# Patient Record
Sex: MALE | Race: WHITE | Employment: OTHER | URBAN - METROPOLITAN AREA
[De-identification: names, ages, dates, MRNs, and addresses within clinical notes are randomized per-mention and may not be internally consistent; named-entity substitution may affect disease eponyms.]

---

## 2021-11-16 ENCOUNTER — HOSPITAL ENCOUNTER (OUTPATIENT)
Dept: PREADMISSION TESTING | Age: 71
Discharge: HOME | End: 2021-11-20
Payer: MEDICARE

## 2021-11-16 VITALS
RESPIRATION RATE: 16 BRPM | BODY MASS INDEX: 31.88 KG/M2 | HEIGHT: 77 IN | WEIGHT: 270 LBS | SYSTOLIC BLOOD PRESSURE: 117 MMHG | OXYGEN SATURATION: 99 % | HEART RATE: 84 BPM | DIASTOLIC BLOOD PRESSURE: 75 MMHG

## 2021-11-16 LAB
ABSOLUTE EOS #: 0.22 K/UL (ref 0–0.44)
ABSOLUTE IMMATURE GRANULOCYTE: 0.02 K/UL (ref 0–0.3)
ABSOLUTE LYMPH #: 2.45 K/UL (ref 1.1–3.7)
ABSOLUTE MONO #: 0.65 K/UL (ref 0.1–1.2)
ANION GAP SERPL CALCULATED.3IONS-SCNC: 10 MMOL/L (ref 9–17)
BASOPHILS # BLD: 1 % (ref 0–2)
BASOPHILS ABSOLUTE: 0.06 K/UL (ref 0–0.2)
BUN BLDV-MCNC: 20 MG/DL (ref 8–23)
CHLORIDE BLD-SCNC: 107 MMOL/L (ref 98–107)
CO2: 25 MMOL/L (ref 20–31)
CREAT SERPL-MCNC: 1 MG/DL (ref 0.7–1.2)
DIFFERENTIAL TYPE: NORMAL
EOSINOPHILS RELATIVE PERCENT: 3 % (ref 1–4)
GFR AFRICAN AMERICAN: >60 ML/MIN
GFR NON-AFRICAN AMERICAN: >60 ML/MIN
GFR SERPL CREATININE-BSD FRML MDRD: NORMAL ML/MIN/{1.73_M2}
GFR SERPL CREATININE-BSD FRML MDRD: NORMAL ML/MIN/{1.73_M2}
HCT VFR BLD CALC: 43.6 % (ref 40.7–50.3)
HEMOGLOBIN: 14.1 G/DL (ref 13–17)
IMMATURE GRANULOCYTES: 0 %
LYMPHOCYTES # BLD: 34 % (ref 24–43)
MCH RBC QN AUTO: 31.2 PG (ref 25.2–33.5)
MCHC RBC AUTO-ENTMCNC: 32.3 G/DL (ref 28.4–34.8)
MCV RBC AUTO: 96.5 FL (ref 82.6–102.9)
MONOCYTES # BLD: 9 % (ref 3–12)
NRBC AUTOMATED: 0 PER 100 WBC
PDW BLD-RTO: 13.7 % (ref 11.8–14.4)
PLATELET # BLD: 268 K/UL (ref 138–453)
PLATELET ESTIMATE: NORMAL
PMV BLD AUTO: 9 FL (ref 8.1–13.5)
POTASSIUM SERPL-SCNC: 5 MMOL/L (ref 3.7–5.3)
RBC # BLD: 4.52 M/UL (ref 4.21–5.77)
RBC # BLD: NORMAL 10*6/UL
SEG NEUTROPHILS: 53 % (ref 36–65)
SEGMENTED NEUTROPHILS ABSOLUTE COUNT: 3.81 K/UL (ref 1.5–8.1)
SODIUM BLD-SCNC: 142 MMOL/L (ref 135–144)
WBC # BLD: 7.2 K/UL (ref 3.5–11.3)
WBC # BLD: NORMAL 10*3/UL

## 2021-11-16 PROCEDURE — 86901 BLOOD TYPING SEROLOGIC RH(D): CPT

## 2021-11-16 PROCEDURE — 86850 RBC ANTIBODY SCREEN: CPT

## 2021-11-16 PROCEDURE — 80051 ELECTROLYTE PANEL: CPT

## 2021-11-16 PROCEDURE — 87086 URINE CULTURE/COLONY COUNT: CPT

## 2021-11-16 PROCEDURE — 85025 COMPLETE CBC W/AUTO DIFF WBC: CPT

## 2021-11-16 PROCEDURE — 84520 ASSAY OF UREA NITROGEN: CPT

## 2021-11-16 PROCEDURE — 93005 ELECTROCARDIOGRAM TRACING: CPT | Performed by: ANESTHESIOLOGY

## 2021-11-16 PROCEDURE — 36415 COLL VENOUS BLD VENIPUNCTURE: CPT

## 2021-11-16 PROCEDURE — 86900 BLOOD TYPING SEROLOGIC ABO: CPT

## 2021-11-16 PROCEDURE — 82565 ASSAY OF CREATININE: CPT

## 2021-11-16 RX ORDER — HEPARIN SODIUM 5000 [USP'U]/ML
5000 INJECTION, SOLUTION INTRAVENOUS; SUBCUTANEOUS ONCE
Status: CANCELLED | OUTPATIENT
Start: 2021-11-23

## 2021-11-16 NOTE — PRE-PROCEDURE INSTRUCTIONS
[facility] ON Tuesday, November 23rd at 10:30 AM    Once you enter the hospital lobby, after your temperature is checked, take the elevators to the second floor. Check-In is at the surgery registration desk  One person age 25 or older may remain in the waiting area while you are in surgery. If you have been given a blood band, you must bring it with you the day of surgery. Continue to take your home medications as you normally do up to and including the night before surgery with the exception of any blood thinning medications. Please stop any blood thinning medications as directed by your surgeon or prescribing physician. Failure to stop certain medications may interfere with your scheduled surgery. These may include:  Aspirin, Warfarin (Coumadin), Clopidogrel (Plavix), Ibuprofen (Motrin, Advil), Naproxen (Aleve), Meloxicam (Mobic), Celecoxib (Celebrex), Eliquis, Pradaxa, Xarelto, Effient, Fish Oil, Herbal supplements. Tylenol is okay to take. If you are diabetic, do not take any of your diabetic medications by mouth the morning of surgery. If you are taking insulin contact the doctor that manages your diabetes for instructions about any changes to your insulin dosages the day before surgery. Do not inject insulin or other injectable diabetic medications the morning of surgery unless otherwise instructed by the doctor who manages your diabetes. Please take the following medication(s) the day of surgery with a small sip of water:    Please use your inhalers at home the day of surgery  COVID 13:50PM 11/19/2021    PREPARING FOR YOUR SURGERY:     Before surgery, you can play an important role in your own health. Because skin is not sterile, we need to be sure that your skin is as free of germs as possible before surgery by carefully washing before surgery. Preparing or prepping skin before surgery can reduce the risk of a surgical site infection.   Do not shave the area of your body where your surgery will be performed unless you received specific permission from your physician. Preoperative Bathing Instructions   Bathe or shower the day of surgery.  Wear clean clothing after bathing.  Shampoo hair before surgery   Keep nails clean    Do not apply alcohol-based hair or skin products,    Do not apply lotion, emollients, cosmetics, perfumes or deodorant the day of surgery.  Do not shave the area of your body where your surgery will be performed unless you receive specific permission from your surgeon. Patient Instructions:    [place] If you are having any type of anesthesia you are to have nothing to eat or drink after midnight the night before your surgery. This includes gum, mints, water or smoking or chewing tobacco.  The only exception to this is a small sip of water to take with any morning dose of heart, blood pressure, or seizure medications. No alcoholic beverages for 24 hours prior to surgery.  Brush your teeth but do not swallow water.  Bring your eyeglasses and case with you. No contacts are to be worn the day of surgery. You also may bring your hearing aids. Most surgical procedures involving anesthesia will require that you remove your dentures prior to surgery.  If you are on C-PAP or Bi-PAP at home and plan on staying in the hospital overnight for your surgery please bring the machine with you.  Do not wear any jewelry or body piercings day of surgery. Also, NO lotion, perfume or deodorant to be used the day of surgery. No nail polish on the operative extremity (arm/leg surgeries)       If you are staying overnight with us, please bring a SMALL bag of personal items.  Please wear loose, comfortable clothing. If you are potentially going to have a cast or brace bring clothing that will fit over them.                                                                                                                            In case of illness - If you have cold or flu like symptoms (high fever, runny nose, sore throat, cough, etc.) rash, nausea, vomiting, loose stools, and/or recent contact with someone who has a contagious disease (chicken pox, measles, etc.) Please call your doctor before coming to the hospital.     If your child is having surgery please make arrangements for any other children to be cared for at home on the day of surgery. Other children are not permitted in recovery room and we want you to be able to spend time with the patient. If other arrangements are not available then we suggest that you have a second adult to stay in the waiting room. Day of Surgery/Procedure:    As a patient at [facility] - INPATIENT you can expect quality medical and nursing care that is centered on your individual needs. Our goal is to make your surgical experience as comfortable as possible    . Transportation After Your Surgery/Procedure: You will need a friend or family member to drive you home after your procedure. Your  must be 25years of age or older and able to sign off on your discharge instructions. A taxi cab or any other form of public transportation is not acceptable. Your friend or family member must stay at the hospital throughout your procedure. Someone must remain with you for the first 24 hours after your surgery if you receive anesthesia or medication. If you do not have someone to stay with you, your procedure may be cancelled.       If you have any other questions regarding your procedure or the day of surgery, please call [phone]      _________________________  ____________________________  Signature (Patient)              Signature (Provider) & date

## 2021-11-17 LAB
ABO/RH: NORMAL
ANTIBODY SCREEN: NEGATIVE
ARM BAND NUMBER: NORMAL
CULTURE: NORMAL
EKG ATRIAL RATE: 62 BPM
EKG P AXIS: 21 DEGREES
EKG P-R INTERVAL: 212 MS
EKG Q-T INTERVAL: 390 MS
EKG QRS DURATION: 88 MS
EKG QTC CALCULATION (BAZETT): 395 MS
EKG R AXIS: 90 DEGREES
EKG T AXIS: 51 DEGREES
EKG VENTRICULAR RATE: 62 BPM
EXPIRATION DATE: NORMAL
Lab: NORMAL
SPECIMEN DESCRIPTION: NORMAL

## 2021-11-17 PROCEDURE — 93010 ELECTROCARDIOGRAM REPORT: CPT | Performed by: INTERNAL MEDICINE

## 2021-11-19 ENCOUNTER — HOSPITAL ENCOUNTER (OUTPATIENT)
Dept: LAB | Age: 71
Setting detail: SPECIMEN
Discharge: HOME | End: 2021-11-19
Payer: MEDICARE

## 2021-11-19 DIAGNOSIS — Z01.818 PREOP TESTING: Primary | ICD-10-CM

## 2021-11-19 PROCEDURE — U0005 INFEC AGEN DETEC AMPLI PROBE: HCPCS

## 2021-11-19 PROCEDURE — U0003 INFECTIOUS AGENT DETECTION BY NUCLEIC ACID (DNA OR RNA); SEVERE ACUTE RESPIRATORY SYNDROME CORONAVIRUS 2 (SARS-COV-2) (CORONAVIRUS DISEASE [COVID-19]), AMPLIFIED PROBE TECHNIQUE, MAKING USE OF HIGH THROUGHPUT TECHNOLOGIES AS DESCRIBED BY CMS-2020-01-R: HCPCS

## 2021-11-20 LAB
SARS-COV-2: NORMAL
SARS-COV-2: NOT DETECTED
SOURCE: NORMAL

## 2021-11-23 ENCOUNTER — ANESTHESIA EVENT (OUTPATIENT)
Dept: OPERATING ROOM | Age: 71
DRG: 716 | End: 2021-11-23
Payer: MEDICARE

## 2021-11-23 ENCOUNTER — ANESTHESIA (OUTPATIENT)
Dept: OPERATING ROOM | Age: 71
DRG: 716 | End: 2021-11-23
Payer: MEDICARE

## 2021-11-23 ENCOUNTER — HOSPITAL ENCOUNTER (INPATIENT)
Age: 71
LOS: 1 days | Discharge: HOME | DRG: 716 | End: 2021-11-24
Attending: UROLOGY | Admitting: UROLOGY
Payer: MEDICARE

## 2021-11-23 VITALS — TEMPERATURE: 96.8 F | OXYGEN SATURATION: 95 % | DIASTOLIC BLOOD PRESSURE: 61 MMHG | SYSTOLIC BLOOD PRESSURE: 100 MMHG

## 2021-11-23 DIAGNOSIS — C61 PROSTATE CANCER (HCC): Primary | ICD-10-CM

## 2021-11-23 LAB
ANION GAP SERPL CALCULATED.3IONS-SCNC: 9 MMOL/L (ref 9–17)
BUN BLDV-MCNC: 15 MG/DL (ref 8–23)
BUN/CREAT BLD: 16 (ref 9–20)
CALCIUM SERPL-MCNC: 9.2 MG/DL (ref 8.6–10.4)
CHLORIDE BLD-SCNC: 105 MMOL/L (ref 98–107)
CO2: 23 MMOL/L (ref 20–31)
CREAT SERPL-MCNC: 0.96 MG/DL (ref 0.7–1.2)
GFR AFRICAN AMERICAN: >60 ML/MIN
GFR NON-AFRICAN AMERICAN: >60 ML/MIN
GFR SERPL CREATININE-BSD FRML MDRD: ABNORMAL ML/MIN/{1.73_M2}
GFR SERPL CREATININE-BSD FRML MDRD: ABNORMAL ML/MIN/{1.73_M2}
GLUCOSE BLD-MCNC: 135 MG/DL (ref 70–99)
HCT VFR BLD CALC: 41.5 % (ref 40.7–50.3)
HEMOGLOBIN: 13.6 G/DL (ref 13–17)
MCH RBC QN AUTO: 31.6 PG (ref 25.2–33.5)
MCHC RBC AUTO-ENTMCNC: 32.8 G/DL (ref 28.4–34.8)
MCV RBC AUTO: 96.3 FL (ref 82.6–102.9)
NRBC AUTOMATED: 0 PER 100 WBC
PDW BLD-RTO: 13.6 % (ref 11.8–14.4)
PLATELET # BLD: 226 K/UL (ref 138–453)
PMV BLD AUTO: 9 FL (ref 8.1–13.5)
POTASSIUM SERPL-SCNC: 4.4 MMOL/L (ref 3.7–5.3)
RBC # BLD: 4.31 M/UL (ref 4.21–5.77)
SODIUM BLD-SCNC: 137 MMOL/L (ref 135–144)
WBC # BLD: 9.8 K/UL (ref 3.5–11.3)

## 2021-11-23 PROCEDURE — 2580000003 HC RX 258: Performed by: ANESTHESIOLOGY

## 2021-11-23 PROCEDURE — 6360000002 HC RX W HCPCS: Performed by: UROLOGY

## 2021-11-23 PROCEDURE — 88309 TISSUE EXAM BY PATHOLOGIST: CPT

## 2021-11-23 PROCEDURE — 7100000000 HC PACU RECOVERY - FIRST 15 MIN: Performed by: UROLOGY

## 2021-11-23 PROCEDURE — 3600000019 HC SURGERY ROBOT ADDTL 15MIN: Performed by: UROLOGY

## 2021-11-23 PROCEDURE — 2720000010 HC SURG SUPPLY STERILE: Performed by: UROLOGY

## 2021-11-23 PROCEDURE — 1200000000 HC SEMI PRIVATE

## 2021-11-23 PROCEDURE — 2580000003 HC RX 258: Performed by: UROLOGY

## 2021-11-23 PROCEDURE — 2709999900 HC NON-CHARGEABLE SUPPLY: Performed by: UROLOGY

## 2021-11-23 PROCEDURE — 7100000001 HC PACU RECOVERY - ADDTL 15 MIN: Performed by: UROLOGY

## 2021-11-23 PROCEDURE — 85027 COMPLETE CBC AUTOMATED: CPT

## 2021-11-23 PROCEDURE — 88307 TISSUE EXAM BY PATHOLOGIST: CPT

## 2021-11-23 PROCEDURE — 6360000002 HC RX W HCPCS: Performed by: NURSE ANESTHETIST, CERTIFIED REGISTERED

## 2021-11-23 PROCEDURE — 36415 COLL VENOUS BLD VENIPUNCTURE: CPT

## 2021-11-23 PROCEDURE — 3600000009 HC SURGERY ROBOT BASE: Performed by: UROLOGY

## 2021-11-23 PROCEDURE — S2900 ROBOTIC SURGICAL SYSTEM: HCPCS | Performed by: UROLOGY

## 2021-11-23 PROCEDURE — 3700000001 HC ADD 15 MINUTES (ANESTHESIA): Performed by: UROLOGY

## 2021-11-23 PROCEDURE — 80048 BASIC METABOLIC PNL TOTAL CA: CPT

## 2021-11-23 PROCEDURE — 6370000000 HC RX 637 (ALT 250 FOR IP): Performed by: UROLOGY

## 2021-11-23 PROCEDURE — 2500000003 HC RX 250 WO HCPCS: Performed by: NURSE ANESTHETIST, CERTIFIED REGISTERED

## 2021-11-23 PROCEDURE — 3700000000 HC ANESTHESIA ATTENDED CARE: Performed by: UROLOGY

## 2021-11-23 PROCEDURE — 2500000003 HC RX 250 WO HCPCS: Performed by: UROLOGY

## 2021-11-23 RX ORDER — BUPIVACAINE HYDROCHLORIDE AND EPINEPHRINE 2.5; 5 MG/ML; UG/ML
INJECTION, SOLUTION EPIDURAL; INFILTRATION; INTRACAUDAL; PERINEURAL PRN
Status: DISCONTINUED | OUTPATIENT
Start: 2021-11-23 | End: 2021-11-23 | Stop reason: HOSPADM

## 2021-11-23 RX ORDER — SODIUM CHLORIDE 9 MG/ML
INJECTION, SOLUTION INTRAVENOUS CONTINUOUS
Status: DISCONTINUED | OUTPATIENT
Start: 2021-11-23 | End: 2021-11-24 | Stop reason: HOSPADM

## 2021-11-23 RX ORDER — DOCUSATE SODIUM 100 MG/1
100 CAPSULE, LIQUID FILLED ORAL 2 TIMES DAILY
Status: DISCONTINUED | OUTPATIENT
Start: 2021-11-23 | End: 2021-11-24 | Stop reason: HOSPADM

## 2021-11-23 RX ORDER — PROPOFOL 10 MG/ML
INJECTION, EMULSION INTRAVENOUS PRN
Status: DISCONTINUED | OUTPATIENT
Start: 2021-11-23 | End: 2021-11-23 | Stop reason: SDUPTHER

## 2021-11-23 RX ORDER — CIPROFLOXACIN 500 MG/1
500 TABLET, FILM COATED ORAL 2 TIMES DAILY
Qty: 6 TABLET | Refills: 0 | Status: SHIPPED | OUTPATIENT
Start: 2021-11-23 | End: 2021-11-26

## 2021-11-23 RX ORDER — LIDOCAINE HYDROCHLORIDE 20 MG/ML
INJECTION, SOLUTION EPIDURAL; INFILTRATION; INTRACAUDAL; PERINEURAL PRN
Status: DISCONTINUED | OUTPATIENT
Start: 2021-11-23 | End: 2021-11-23 | Stop reason: SDUPTHER

## 2021-11-23 RX ORDER — POTASSIUM CHLORIDE 7.45 MG/ML
10 INJECTION INTRAVENOUS PRN
Status: DISCONTINUED | OUTPATIENT
Start: 2021-11-23 | End: 2021-11-24 | Stop reason: HOSPADM

## 2021-11-23 RX ORDER — SODIUM CHLORIDE 9 MG/ML
25 INJECTION, SOLUTION INTRAVENOUS PRN
Status: DISCONTINUED | OUTPATIENT
Start: 2021-11-23 | End: 2021-11-23 | Stop reason: HOSPADM

## 2021-11-23 RX ORDER — ONDANSETRON 2 MG/ML
4 INJECTION INTRAMUSCULAR; INTRAVENOUS EVERY 6 HOURS PRN
Status: DISCONTINUED | OUTPATIENT
Start: 2021-11-23 | End: 2021-11-24 | Stop reason: HOSPADM

## 2021-11-23 RX ORDER — HYDROCODONE BITARTRATE AND ACETAMINOPHEN 5; 325 MG/1; MG/1
2 TABLET ORAL EVERY 4 HOURS PRN
Status: DISCONTINUED | OUTPATIENT
Start: 2021-11-23 | End: 2021-11-24 | Stop reason: HOSPADM

## 2021-11-23 RX ORDER — FENTANYL CITRATE 50 UG/ML
INJECTION, SOLUTION INTRAMUSCULAR; INTRAVENOUS PRN
Status: DISCONTINUED | OUTPATIENT
Start: 2021-11-23 | End: 2021-11-23 | Stop reason: SDUPTHER

## 2021-11-23 RX ORDER — MAGNESIUM HYDROXIDE 1200 MG/15ML
LIQUID ORAL CONTINUOUS PRN
Status: COMPLETED | OUTPATIENT
Start: 2021-11-23 | End: 2021-11-23

## 2021-11-23 RX ORDER — HYDROCODONE BITARTRATE AND ACETAMINOPHEN 5; 325 MG/1; MG/1
2 TABLET ORAL PRN
Status: DISCONTINUED | OUTPATIENT
Start: 2021-11-23 | End: 2021-11-23 | Stop reason: HOSPADM

## 2021-11-23 RX ORDER — MIDAZOLAM HYDROCHLORIDE 1 MG/ML
INJECTION INTRAMUSCULAR; INTRAVENOUS PRN
Status: DISCONTINUED | OUTPATIENT
Start: 2021-11-23 | End: 2021-11-23 | Stop reason: SDUPTHER

## 2021-11-23 RX ORDER — DOCUSATE SODIUM 100 MG/1
100 CAPSULE, LIQUID FILLED ORAL 2 TIMES DAILY
Qty: 60 CAPSULE | Refills: 0 | Status: SHIPPED | OUTPATIENT
Start: 2021-11-23 | End: 2021-12-23

## 2021-11-23 RX ORDER — ROCURONIUM BROMIDE 10 MG/ML
INJECTION, SOLUTION INTRAVENOUS PRN
Status: DISCONTINUED | OUTPATIENT
Start: 2021-11-23 | End: 2021-11-23 | Stop reason: SDUPTHER

## 2021-11-23 RX ORDER — HYDROCODONE BITARTRATE AND ACETAMINOPHEN 5; 325 MG/1; MG/1
1 TABLET ORAL PRN
Status: DISCONTINUED | OUTPATIENT
Start: 2021-11-23 | End: 2021-11-23 | Stop reason: HOSPADM

## 2021-11-23 RX ORDER — POTASSIUM CHLORIDE 20 MEQ/1
40 TABLET, EXTENDED RELEASE ORAL PRN
Status: DISCONTINUED | OUTPATIENT
Start: 2021-11-23 | End: 2021-11-24 | Stop reason: HOSPADM

## 2021-11-23 RX ORDER — KETOROLAC TROMETHAMINE 30 MG/ML
INJECTION, SOLUTION INTRAMUSCULAR; INTRAVENOUS PRN
Status: DISCONTINUED | OUTPATIENT
Start: 2021-11-23 | End: 2021-11-23 | Stop reason: SDUPTHER

## 2021-11-23 RX ORDER — FENTANYL CITRATE 50 UG/ML
50 INJECTION, SOLUTION INTRAMUSCULAR; INTRAVENOUS EVERY 5 MIN PRN
Status: DISCONTINUED | OUTPATIENT
Start: 2021-11-23 | End: 2021-11-23 | Stop reason: HOSPADM

## 2021-11-23 RX ORDER — HEPARIN SODIUM 5000 [USP'U]/ML
5000 INJECTION, SOLUTION INTRAVENOUS; SUBCUTANEOUS ONCE
Status: COMPLETED | OUTPATIENT
Start: 2021-11-23 | End: 2021-11-23

## 2021-11-23 RX ORDER — SODIUM CHLORIDE 0.9 % (FLUSH) 0.9 %
10 SYRINGE (ML) INJECTION EVERY 12 HOURS SCHEDULED
Status: DISCONTINUED | OUTPATIENT
Start: 2021-11-23 | End: 2021-11-24 | Stop reason: HOSPADM

## 2021-11-23 RX ORDER — SODIUM CHLORIDE 9 MG/ML
INJECTION, SOLUTION INTRAVENOUS CONTINUOUS
Status: DISCONTINUED | OUTPATIENT
Start: 2021-11-23 | End: 2021-11-23

## 2021-11-23 RX ORDER — SODIUM CHLORIDE 0.9 % (FLUSH) 0.9 %
10 SYRINGE (ML) INJECTION EVERY 12 HOURS SCHEDULED
Status: DISCONTINUED | OUTPATIENT
Start: 2021-11-23 | End: 2021-11-23 | Stop reason: HOSPADM

## 2021-11-23 RX ORDER — SODIUM CHLORIDE 0.9 % (FLUSH) 0.9 %
10 SYRINGE (ML) INJECTION PRN
Status: DISCONTINUED | OUTPATIENT
Start: 2021-11-23 | End: 2021-11-23 | Stop reason: HOSPADM

## 2021-11-23 RX ORDER — SODIUM CHLORIDE 9 MG/ML
25 INJECTION, SOLUTION INTRAVENOUS PRN
Status: DISCONTINUED | OUTPATIENT
Start: 2021-11-23 | End: 2021-11-24 | Stop reason: HOSPADM

## 2021-11-23 RX ORDER — SODIUM CHLORIDE 0.9 % (FLUSH) 0.9 %
10 SYRINGE (ML) INJECTION PRN
Status: DISCONTINUED | OUTPATIENT
Start: 2021-11-23 | End: 2021-11-24 | Stop reason: HOSPADM

## 2021-11-23 RX ORDER — METOPROLOL TARTRATE 5 MG/5ML
5 INJECTION INTRAVENOUS EVERY 6 HOURS PRN
Status: DISCONTINUED | OUTPATIENT
Start: 2021-11-23 | End: 2021-11-24 | Stop reason: HOSPADM

## 2021-11-23 RX ORDER — FENTANYL CITRATE 50 UG/ML
25 INJECTION, SOLUTION INTRAMUSCULAR; INTRAVENOUS EVERY 5 MIN PRN
Status: DISCONTINUED | OUTPATIENT
Start: 2021-11-23 | End: 2021-11-23 | Stop reason: HOSPADM

## 2021-11-23 RX ORDER — LIDOCAINE HYDROCHLORIDE 10 MG/ML
1 INJECTION, SOLUTION EPIDURAL; INFILTRATION; INTRACAUDAL; PERINEURAL
Status: DISCONTINUED | OUTPATIENT
Start: 2021-11-23 | End: 2021-11-23 | Stop reason: HOSPADM

## 2021-11-23 RX ORDER — PROMETHAZINE HYDROCHLORIDE 25 MG/ML
6.25 INJECTION, SOLUTION INTRAMUSCULAR; INTRAVENOUS
Status: DISCONTINUED | OUTPATIENT
Start: 2021-11-23 | End: 2021-11-23 | Stop reason: HOSPADM

## 2021-11-23 RX ORDER — ONDANSETRON 2 MG/ML
INJECTION INTRAMUSCULAR; INTRAVENOUS PRN
Status: DISCONTINUED | OUTPATIENT
Start: 2021-11-23 | End: 2021-11-23 | Stop reason: SDUPTHER

## 2021-11-23 RX ORDER — PROMETHAZINE HYDROCHLORIDE 12.5 MG/1
12.5 TABLET ORAL EVERY 6 HOURS PRN
Status: DISCONTINUED | OUTPATIENT
Start: 2021-11-23 | End: 2021-11-24 | Stop reason: HOSPADM

## 2021-11-23 RX ORDER — PHENYLEPHRINE HCL IN 0.9% NACL 1 MG/10 ML
SYRINGE (ML) INTRAVENOUS PRN
Status: DISCONTINUED | OUTPATIENT
Start: 2021-11-23 | End: 2021-11-23 | Stop reason: SDUPTHER

## 2021-11-23 RX ORDER — ONDANSETRON 2 MG/ML
4 INJECTION INTRAMUSCULAR; INTRAVENOUS
Status: DISCONTINUED | OUTPATIENT
Start: 2021-11-23 | End: 2021-11-23 | Stop reason: HOSPADM

## 2021-11-23 RX ORDER — DEXAMETHASONE SODIUM PHOSPHATE 10 MG/ML
INJECTION INTRAMUSCULAR; INTRAVENOUS PRN
Status: DISCONTINUED | OUTPATIENT
Start: 2021-11-23 | End: 2021-11-23 | Stop reason: SDUPTHER

## 2021-11-23 RX ORDER — SODIUM CHLORIDE, SODIUM LACTATE, POTASSIUM CHLORIDE, CALCIUM CHLORIDE 600; 310; 30; 20 MG/100ML; MG/100ML; MG/100ML; MG/100ML
INJECTION, SOLUTION INTRAVENOUS CONTINUOUS
Status: DISCONTINUED | OUTPATIENT
Start: 2021-11-23 | End: 2021-11-23

## 2021-11-23 RX ORDER — HYDROCODONE BITARTRATE AND ACETAMINOPHEN 5; 325 MG/1; MG/1
2 TABLET ORAL EVERY 6 HOURS PRN
Qty: 30 TABLET | Refills: 0 | Status: SHIPPED | OUTPATIENT
Start: 2021-11-23 | End: 2021-12-01

## 2021-11-23 RX ADMIN — SODIUM CHLORIDE, POTASSIUM CHLORIDE, SODIUM LACTATE AND CALCIUM CHLORIDE: 600; 310; 30; 20 INJECTION, SOLUTION INTRAVENOUS at 14:05

## 2021-11-23 RX ADMIN — CEFAZOLIN SODIUM 3000 MG: 10 INJECTION, POWDER, FOR SOLUTION INTRAVENOUS at 12:26

## 2021-11-23 RX ADMIN — Medication 200 MCG: at 12:42

## 2021-11-23 RX ADMIN — DOCUSATE SODIUM 100 MG: 100 CAPSULE ORAL at 20:48

## 2021-11-23 RX ADMIN — Medication 100 MCG: at 13:48

## 2021-11-23 RX ADMIN — PROPOFOL 200 MG: 10 INJECTION, EMULSION INTRAVENOUS at 12:33

## 2021-11-23 RX ADMIN — SODIUM CHLORIDE: 9 INJECTION, SOLUTION INTRAVENOUS at 16:45

## 2021-11-23 RX ADMIN — HEPARIN SODIUM 5000 UNITS: 5000 INJECTION INTRAVENOUS; SUBCUTANEOUS at 11:17

## 2021-11-23 RX ADMIN — SODIUM CHLORIDE, POTASSIUM CHLORIDE, SODIUM LACTATE AND CALCIUM CHLORIDE: 600; 310; 30; 20 INJECTION, SOLUTION INTRAVENOUS at 11:19

## 2021-11-23 RX ADMIN — Medication 100 MCG: at 12:33

## 2021-11-23 RX ADMIN — MIDAZOLAM 2 MG: 1 INJECTION INTRAMUSCULAR; INTRAVENOUS at 12:26

## 2021-11-23 RX ADMIN — Medication 200 MCG: at 12:52

## 2021-11-23 RX ADMIN — CEFAZOLIN SODIUM 2000 MG: 10 INJECTION, POWDER, FOR SOLUTION INTRAVENOUS at 17:30

## 2021-11-23 RX ADMIN — ROCURONIUM BROMIDE 50 MG: 10 INJECTION, SOLUTION INTRAVENOUS at 12:33

## 2021-11-23 RX ADMIN — LIDOCAINE HYDROCHLORIDE 100 MG: 20 INJECTION, SOLUTION EPIDURAL; INFILTRATION; INTRACAUDAL; PERINEURAL at 12:33

## 2021-11-23 RX ADMIN — ONDANSETRON 4 MG: 2 INJECTION, SOLUTION INTRAMUSCULAR; INTRAVENOUS at 14:15

## 2021-11-23 RX ADMIN — KETOROLAC TROMETHAMINE 15 MG: 30 INJECTION, SOLUTION INTRAMUSCULAR; INTRAVENOUS at 14:15

## 2021-11-23 RX ADMIN — SUGAMMADEX 200 MG: 100 INJECTION, SOLUTION INTRAVENOUS at 14:33

## 2021-11-23 RX ADMIN — DEXAMETHASONE SODIUM PHOSPHATE 10 MG: 10 INJECTION INTRAMUSCULAR; INTRAVENOUS at 12:40

## 2021-11-23 RX ADMIN — ROCURONIUM BROMIDE 10 MG: 10 INJECTION, SOLUTION INTRAVENOUS at 13:45

## 2021-11-23 ASSESSMENT — PAIN SCALES - GENERAL
PAINLEVEL_OUTOF10: 3
PAINLEVEL_OUTOF10: 0
PAINLEVEL_OUTOF10: 0
PAINLEVEL_OUTOF10: 3
PAINLEVEL_OUTOF10: 0
PAINLEVEL_OUTOF10: 3

## 2021-11-23 ASSESSMENT — PULMONARY FUNCTION TESTS
PIF_VALUE: 1
PIF_VALUE: 1
PIF_VALUE: 17
PIF_VALUE: 27
PIF_VALUE: 21
PIF_VALUE: 17
PIF_VALUE: 18
PIF_VALUE: 17
PIF_VALUE: 21
PIF_VALUE: 17
PIF_VALUE: 23
PIF_VALUE: 27
PIF_VALUE: 17
PIF_VALUE: 18
PIF_VALUE: 27
PIF_VALUE: 2
PIF_VALUE: 27
PIF_VALUE: 21
PIF_VALUE: 27
PIF_VALUE: 20
PIF_VALUE: 27
PIF_VALUE: 1
PIF_VALUE: 21
PIF_VALUE: 20
PIF_VALUE: 27
PIF_VALUE: 26
PIF_VALUE: 27
PIF_VALUE: 25
PIF_VALUE: 26
PIF_VALUE: 17
PIF_VALUE: 27
PIF_VALUE: 17
PIF_VALUE: 17
PIF_VALUE: 16
PIF_VALUE: 15
PIF_VALUE: 17
PIF_VALUE: 17
PIF_VALUE: 27
PIF_VALUE: 17
PIF_VALUE: 27
PIF_VALUE: 20
PIF_VALUE: 27
PIF_VALUE: 17
PIF_VALUE: 30
PIF_VALUE: 26
PIF_VALUE: 27
PIF_VALUE: 28
PIF_VALUE: 29
PIF_VALUE: 20
PIF_VALUE: 28
PIF_VALUE: 18
PIF_VALUE: 1
PIF_VALUE: 27
PIF_VALUE: 17
PIF_VALUE: 20
PIF_VALUE: 27
PIF_VALUE: 17
PIF_VALUE: 17
PIF_VALUE: 27
PIF_VALUE: 17
PIF_VALUE: 1
PIF_VALUE: 2
PIF_VALUE: 1
PIF_VALUE: 21
PIF_VALUE: 21
PIF_VALUE: 17
PIF_VALUE: 19
PIF_VALUE: 17
PIF_VALUE: 28
PIF_VALUE: 17
PIF_VALUE: 29
PIF_VALUE: 21
PIF_VALUE: 26
PIF_VALUE: 27
PIF_VALUE: 20
PIF_VALUE: 29
PIF_VALUE: 18
PIF_VALUE: 26
PIF_VALUE: 27
PIF_VALUE: 27
PIF_VALUE: 17
PIF_VALUE: 27
PIF_VALUE: 1
PIF_VALUE: 21
PIF_VALUE: 25
PIF_VALUE: 27
PIF_VALUE: 23
PIF_VALUE: 17
PIF_VALUE: 26
PIF_VALUE: 2
PIF_VALUE: 27
PIF_VALUE: 17
PIF_VALUE: 16
PIF_VALUE: 29
PIF_VALUE: 20
PIF_VALUE: 26
PIF_VALUE: 27
PIF_VALUE: 21
PIF_VALUE: 22
PIF_VALUE: 27
PIF_VALUE: 17
PIF_VALUE: 27
PIF_VALUE: 17
PIF_VALUE: 27
PIF_VALUE: 21
PIF_VALUE: 28
PIF_VALUE: 27
PIF_VALUE: 17
PIF_VALUE: 17
PIF_VALUE: 21
PIF_VALUE: 16
PIF_VALUE: 27
PIF_VALUE: 29
PIF_VALUE: 18

## 2021-11-23 ASSESSMENT — PAIN DESCRIPTION - LOCATION: LOCATION: ABDOMEN;PENIS

## 2021-11-23 ASSESSMENT — ENCOUNTER SYMPTOMS: SHORTNESS OF BREATH: 0

## 2021-11-23 ASSESSMENT — PAIN DESCRIPTION - DESCRIPTORS: DESCRIPTORS: BURNING;TIGHTNESS

## 2021-11-23 ASSESSMENT — PAIN - FUNCTIONAL ASSESSMENT: PAIN_FUNCTIONAL_ASSESSMENT: 0-10

## 2021-11-23 NOTE — PROGRESS NOTES
Pt admitted to room 2018 from PACU in stable condition.   Oriented to room and surroundings  Bed in lowest position, wheels locked, 2/4 side rails up  Call light in reach, room free of clutter, adequate lighting provided  Denies any further questions at this time  Instructed to call out with any questions/concerns/new onset of pain and/or n/v   White board updated  Continue to monitor with hourly rounding  Non-skid socks on/at bedside

## 2021-11-23 NOTE — H&P
Interval H&P Note    Pt Name: Gianni Gallo  MRN: [id number]  YOB: 1950  Date of evaluation: 11/23/2021      [x] I have reviewed the hard copy urology progress note by Dr. Marce Soni dated 11/8/2021 labeled in short chart which meets the criteria for an Interval History and Physical note. [x] I have examined  Gianni Gallo  There are no changes to the patient who is scheduled for a radical prostatectomy with possible pelvic lymph node dissection laparoscopic robotic XI by Dr. Marce Soni for prostate cancer. The patient denies new health changes, fever, chills, wheezing, cough, increased SOB, chest pain, open sores or wounds. Denies hx diabetes. Denies taking any blood thinning medications in the last 10 days. Vital signs: /68   Pulse 81   Temp 97.5 °F (36.4 °C) (Oral)   Resp 18   Ht 6' 5\" (1.956 m)   Wt 270 lb (122.5 kg)   SpO2 100%   BMI 32.02 kg/m²     Allergies:  Patient has no known allergies.     Medications:    Prior to Admission medications    Not on File         Past Medical History:     Past Medical History:   Diagnosis Date    Arthritis     Cancer ([address]     melanoma    [place] (hard of hearing[place]     bilateral    Prostate cancer ([address]         Past Surgical History:     Past Surgical History:   Procedure Laterality Date    COLONOSCOPY      JOINT REPLACEMENT Left     hip    MALIGNANT SKIN LESION EXCISION Left 10/05/2016    left back melanoma- Dr Teresa Morales History:     Social History     Socioeconomic History    Marital status:      Spouse name: None    Number of children: None    Years of education: None    Highest education level: None   Occupational History    None   Tobacco Use    Smoking status: Never Smoker    Smokeless tobacco: Never Used   Vaping Use    Vaping Use: Never used   Substance and Sexual Activity    Alcohol use: No    Drug use: No    Sexual activity: None   Other Topics Concern    None   Social History Narrative    None Social Determinants of Health     Financial Resource Strain:     Difficulty of Paying Living Expenses: Not on file   Food Insecurity:     Worried About Running Out of Food in the Last Year: Not on file    Sandra of Food in the Last Year: Not on file   Transportation Needs:     Lack of Transportation (Medical): Not on file    Lack of Transportation (Non-Medical): Not on file   Physical Activity:     Days of Exercise per Week: Not on file    Minutes of Exercise per Session: Not on file   Stress:     Feeling of Stress : Not on file   Social Connections:     Frequency of Communication with Friends and Family: Not on file    Frequency of Social Gatherings with Friends and Family: Not on file    Attends Yarsanism Services: Not on file    Active Member of [address] [place] [address] [employer] or Organizations: Not on file    Attends Club or Organization Meetings: Not on file    Marital Status: Not on file   Intimate Partner Violence:     Fear of Current or Ex-Partner: Not on file    Emotionally Abused: Not on file    Physically Abused: Not on file    Sexually Abused: Not on file   Housing Stability:     Unable to Pay for Housing in the Last Year: Not on file    Number of [place] in the Last Year: Not on file    Unstable Housing in the Last Year: Not on file       Family History:     Family History   Problem Relation Age of Onset    No Known Problems Mother     Other Father      Review of Systems:     CONSTITUTIONAL: negative for fevers, chills, sweats, fatigue, weight loss  HEENT: Wears bilateral hearing aids negative for vision, runny nose, throat pain  RESPIRATORY:  negative for shortness of breath, cough, congestion, wheezing. CARDIOVASCULAR:  negative for chest pain, palpitations. GASTROINTESTINAL:  negative for nausea, vomiting, diarrhea, constipation, change in bowel habits, abdominal pain   GENITOURINARY: Prostate cancer.  Slow urine stream. negative for difficulty of urination, burning with urination, frequency INTEGUMENT:  negative for rash, skin lesions, easy bruising   HEMATOLOGIC/LYMPHATIC: history of superficial thrombophlebitis left upper leg - patient did not require medication. negative for swelling/edema   ALLERGIC/IMMUNOLOGIC:  negative for urticaria , itching  ENDOCRINE:  negative increase in drinking, increase in urination, hot or cold intolerance  MUSCULOSKELETAL:  negative joint pains, muscle aches, swelling of joints  NEUROLOGICAL:  negative for headaches, dizziness, lightheadedness, numbness, pain, tingling extremities  BEHAVIOR/PSYCH:  negative for depression, anxiety     Physical Exam:     General Appearance:  alert, well appearing, and in no acute distress  Mental status: oriented to person, place, and time with normal affect  Head:  normocephalic, atraumatic. Eye: no icterus, redness, pupils equal and reactive, extraocular eye movements intact, conjunctiva clear  Ear: Impaired hearing - bilateral hearing aids. normal external ear, no discharge  Nose:  no drainage noted  Mouth: mucous membranes moist  Neck: supple, no carotid bruits, thyroid not palpable  Lungs: Bilateral equal air entry, clear to ausculation, no wheezing, rales or rhonchi, normal effort  Cardiovascular: HR 81 normal rate, regular rhythm, no murmur, gallop, rub. Abdomen: Soft, nontender, nondistended, normal bowel sounds  Neurologic: There are no new focal motor or sensory deficits, normal muscle tone and bulk, no abnormal sensation, normal speech, cranial nerves II through VII and IX-XII grossly intact Impaired cranial nerve XIII.    Skin: No gross lesions, rashes, bruising or bleeding on exposed skin area  Extremities:  peripheral pulses palpable, no pedal edema or calf pain with palpation  Psych: normal affect     Labs:  Recent Labs     11/16/21  1428   HGB 14.1   HCT 43.6   WBC 7.2   MCV 96.5         K 5.0      CO2 25   BUN 20   CREATININE 1.00       Recent Labs     11/19/21  0830   COVID19       Not Detected

## 2021-11-23 NOTE — PLAN OF CARE
Problem: Infection:  Goal: Will remain free from infection  Description: Will remain free from infection  Outcome: Ongoing  Note: Ongoing      Problem: Safety:  Goal: Free from accidental physical injury  Description: Free from accidental physical injury  Outcome: Ongoing  Note: Ongoing      Problem: Pain:  Goal: Patient's pain/discomfort is manageable  Description: Patient's pain/discomfort is manageable  Outcome: Ongoing  Note: Ongoing     Problem: Skin Integrity:  Goal: Skin integrity will stabilize  Description: Skin integrity will stabilize  Outcome: Ongoing  Note: Ongoing     Problem: Discharge Planning:  Goal: Patients continuum of care needs are met  Description: Patients continuum of care needs are met  Outcome: Ongoing  Note: Ongoing     Problem: Urinary Elimination:  Goal: Will remain free from infection  Description: Will remain free from infection  Outcome: Ongoing  Note: Ongoing      Problem: Urinary Elimination:  Goal: Ability to achieve a balanced intake and output will improve  Description: Ability to achieve a balanced intake and output will improve  Outcome: Ongoing  Note: Ongoing

## 2021-11-23 NOTE — ANESTHESIA PRE PROCEDURE
lb (126.1 kg)     Body mass index is 32.02 kg/m². CBC:   Lab Results   Component Value Date    WBC 7.2 11/16/2021    RBC 4.52 11/16/2021    HGB 14.1 11/16/2021    HCT 43.6 11/16/2021    MCV 96.5 11/16/2021    RDW 13.7 11/16/2021     11/16/2021       CMP:   Lab Results   Component Value Date     11/16/2021    K 5.0 11/16/2021     11/16/2021    CO2 25 11/16/2021    BUN 20 11/16/2021    CREATININE 1.00 11/16/2021    GFRAA >60 11/16/2021    LABGLOM >60 11/16/2021       POC Tests: No results for input(s): POCGLU, POCNA, POCK, POCCL, POCBUN, POCHEMO, POCHCT in the last 72 hours. Coags: No results found for: PROTIME, INR, APTT    HCG (If Applicable): No results found for: PREGTESTUR, PREGSERUM, HCG, HCGQUANT     ABGs: No results found for: PHART, PO2ART, JCH7NBG, FUK3TKJ, BEART, T7WVSNOD     Type & Screen (If Applicable):  No results found for: LABABO, LABRH    Drug/Infectious Status (If Applicable):  No results found for: HIV, HEPCAB    COVID-19 Screening (If Applicable):   Lab Results   Component Value Date    COVID19 Not Detected 11/19/2021           Anesthesia Evaluation    Airway: Mallampati: I  TM distance: >3 FB   Neck ROM: full  Mouth opening: > = 3 FB Dental:          Pulmonary:       (-) shortness of breath                           Cardiovascular:        (-)  angina                Neuro/Psych:               GI/Hepatic/Renal:             Endo/Other:                     Abdominal:             Vascular:           Other Findings:             Anesthesia Plan      general     ASA 2                                 Skyla Hardy MD   11/23/2021

## 2021-11-23 NOTE — BRIEF OP NOTE
Brief Postoperative Note      Patient: Milly Russell  YOB: 1950  MRN: [id number]    Date of Procedure: 11/23/2021    Pre-Op Diagnosis: DX PROSTATE CA    Post-Op Diagnosis: Same       Procedure(s):  RADICAL PROSTATECTOMY WITH POSSIBLE PELVIC LYMPH NODE DISSECTION   LAPAROSCOPIC ROBOTIC XI    Surgeon(s):  Rohith Knott MD    Assistant:  * No surgical staff found *    Anesthesia: General    Estimated Blood Loss (mL): less than 974     Complications: None    Specimens:   * No specimens in log *    Implants:  * No implants in log *      Drains: * No LDAs found *    Findings: d    Electronically signed by Rohith Knott MD on 11/23/2021 at 12:18 PM

## 2021-11-23 NOTE — ANESTHESIA POSTPROCEDURE EVALUATION
Department of Anesthesiology  Postprocedure Note    Patient: Jose G Escobedo  MRN: [id number]  YOB: 1950  Date of evaluation: 11/23/2021  Time:  3:56 PM     Procedure Summary     Date: 11/23/21 Room / Location: [address] [place] [address]    Anesthesia Start: 1226 Anesthesia Stop: 1450    Procedure: RADICAL PROSTATECTOMY WITH PELVIC LYMPH NODE DISSECTION   LAPAROSCOPIC ROBOTIC XI (N/A Abdomen) Diagnosis: (DX PROSTATE CA)    Surgeons: Patrica Spears MD Responsible Provider: Lenore Duran MD    Anesthesia Type: general ASA Status: 2          Anesthesia Type: general    Aniyah Phase I: Aniyah Score: 10    Aniyah Phase II:      Last vitals: Reviewed and per EMR flowsheets.        Anesthesia Post Evaluation    Complications: no

## 2021-11-24 VITALS
HEART RATE: 68 BPM | OXYGEN SATURATION: 96 % | BODY MASS INDEX: 31.88 KG/M2 | DIASTOLIC BLOOD PRESSURE: 53 MMHG | WEIGHT: 270 LBS | SYSTOLIC BLOOD PRESSURE: 103 MMHG | TEMPERATURE: 98.6 F | RESPIRATION RATE: 16 BRPM | HEIGHT: 77 IN

## 2021-11-24 LAB
ANION GAP SERPL CALCULATED.3IONS-SCNC: 8 MMOL/L (ref 9–17)
BUN BLDV-MCNC: 23 MG/DL (ref 8–23)
BUN/CREAT BLD: 24 (ref 9–20)
CALCIUM SERPL-MCNC: 8.8 MG/DL (ref 8.6–10.4)
CHLORIDE BLD-SCNC: 109 MMOL/L (ref 98–107)
CO2: 22 MMOL/L (ref 20–31)
CREAT SERPL-MCNC: 0.94 MG/DL (ref 0.7–1.2)
GFR AFRICAN AMERICAN: >60 ML/MIN
GFR NON-AFRICAN AMERICAN: >60 ML/MIN
GFR SERPL CREATININE-BSD FRML MDRD: ABNORMAL ML/MIN/{1.73_M2}
GFR SERPL CREATININE-BSD FRML MDRD: ABNORMAL ML/MIN/{1.73_M2}
GLUCOSE BLD-MCNC: 106 MG/DL (ref 70–99)
HCT VFR BLD CALC: 37.7 % (ref 40.7–50.3)
HEMOGLOBIN: 12.3 G/DL (ref 13–17)
MCH RBC QN AUTO: 31.4 PG (ref 25.2–33.5)
MCHC RBC AUTO-ENTMCNC: 32.6 G/DL (ref 28.4–34.8)
MCV RBC AUTO: 96.2 FL (ref 82.6–102.9)
NRBC AUTOMATED: 0 PER 100 WBC
PDW BLD-RTO: 13.6 % (ref 11.8–14.4)
PLATELET # BLD: 210 K/UL (ref 138–453)
PMV BLD AUTO: 8.9 FL (ref 8.1–13.5)
POTASSIUM SERPL-SCNC: 4.5 MMOL/L (ref 3.7–5.3)
RBC # BLD: 3.92 M/UL (ref 4.21–5.77)
SODIUM BLD-SCNC: 139 MMOL/L (ref 135–144)
WBC # BLD: 10.3 K/UL (ref 3.5–11.3)

## 2021-11-24 PROCEDURE — 07BC4ZZ EXCISION OF PELVIS LYMPHATIC, PERCUTANEOUS ENDOSCOPIC APPROACH: ICD-10-PCS | Performed by: UROLOGY

## 2021-11-24 PROCEDURE — 6360000002 HC RX W HCPCS: Performed by: UROLOGY

## 2021-11-24 PROCEDURE — 36415 COLL VENOUS BLD VENIPUNCTURE: CPT

## 2021-11-24 PROCEDURE — 6370000000 HC RX 637 (ALT 250 FOR IP): Performed by: UROLOGY

## 2021-11-24 PROCEDURE — 0VB04ZZ EXCISION OF PROSTATE, PERCUTANEOUS ENDOSCOPIC APPROACH: ICD-10-PCS | Performed by: UROLOGY

## 2021-11-24 PROCEDURE — 2580000003 HC RX 258: Performed by: UROLOGY

## 2021-11-24 PROCEDURE — 80048 BASIC METABOLIC PNL TOTAL CA: CPT

## 2021-11-24 PROCEDURE — 8E0W4CZ ROBOTIC ASSISTED PROCEDURE OF TRUNK REGION, PERCUTANEOUS ENDOSCOPIC APPROACH: ICD-10-PCS | Performed by: UROLOGY

## 2021-11-24 PROCEDURE — 85027 COMPLETE CBC AUTOMATED: CPT

## 2021-11-24 RX ADMIN — SODIUM CHLORIDE: 9 INJECTION, SOLUTION INTRAVENOUS at 00:02

## 2021-11-24 RX ADMIN — CEFAZOLIN SODIUM 2000 MG: 10 INJECTION, POWDER, FOR SOLUTION INTRAVENOUS at 09:12

## 2021-11-24 RX ADMIN — DOCUSATE SODIUM 100 MG: 100 CAPSULE ORAL at 09:12

## 2021-11-24 RX ADMIN — CEFAZOLIN SODIUM 2000 MG: 10 INJECTION, POWDER, FOR SOLUTION INTRAVENOUS at 01:52

## 2021-11-24 NOTE — ACP (ADVANCE CARE PLANNING)
between Advance Directives and portable DNR orders.     Length of ACP Conversation in minutes:  5    Conversation Outcomes:  [x] ACP discussion completed  [] Existing advance directive reviewed with patient; no changes to patient's previously recorded wishes  [] New Advance Directive completed  [] Portable Do Not Rescitate prepared for Provider review and signature  [] POLST/POST/MOLST/MOST prepared for Provider review and signature      Follow-up plan:    [] Schedule follow-up conversation to continue planning  [] Referred individual to Provider for additional questions/concerns   [] Advised patient/agent/surrogate to review completed ACP document and update if needed with changes in condition, patient preferences or care setting    [] This note routed to one or more involved healthcare providers

## 2021-11-24 NOTE — FLOWSHEET NOTE
Patient + spouse was present. States better, no major needs. States about his cancer, states worries, fears. Shared much about his bianca life, Love, trust of God. States great family support.  shared in presence, listening, prayers. Follow up as needed. 11/23/21 1930   Encounter Summary   Services provided to: Patient and family together   Referral/Consult From: [facility]   Support System Spouse   Continue Visiting   (11-23-21)   Complexity of Encounter Moderate   Length of Encounter 30 minutes   Spiritual Assessment Completed Yes   Routine   Type Initial   Spiritual/Sabianist   Type Spiritual support   Assessment Calm; Approachable   Intervention Explored feelings, thoughts, concerns; Prayer; Sustaining presence/ Ministry of presence; Discussed illness/injury and it's impact;  Discussed belief system/Baptism practices/bianca; Discussed relationship with God   Outcome Expressed gratitude; Receptive; Engaged in conversation; Expressed feelings/needs/concerns

## 2021-11-24 NOTE — PLAN OF CARE
Problem: Infection:  Goal: Will remain free from infection  Description: Will remain free from infection  11/24/2021 0321 by Eris Villanueva RN  Outcome: Ongoing     Problem: Safety:  Goal: Free from accidental physical injury  Description: Free from accidental physical injury  11/24/2021 0321 by Eris Villanueva RN  Outcome: Ongoing     Problem: Safety:  Goal: Free from intentional harm  Description: Free from intentional harm  Outcome: Ongoing     Problem: Pain:  Goal: Patient's pain/discomfort is manageable  Description: Patient's pain/discomfort is manageable  11/24/2021 0321 by Eris Villanueva RN  Outcome: Ongoing     Problem: Skin Integrity:  Goal: Skin integrity will stabilize  Description: Skin integrity will stabilize  11/24/2021 0321 by Eris Villanueva RN  Outcome: Ongoing     Problem: Discharge Planning:  Goal: Patients continuum of care needs are met  Description: Patients continuum of care needs are met  11/24/2021 0321 by Eris Villanueva RN  Outcome: Ongoing     Problem: Coping:  Goal: Expressions of feelings of enhanced comfort will increase  Description: Expressions of feelings of enhanced comfort will increase  Outcome: Ongoing     Problem: Urinary Elimination:  Goal: Will remain free from infection  Description: Will remain free from infection  11/24/2021 0321 by Eris Villanueva RN  Outcome: Ongoing     Problem: Urinary Elimination:  Goal: Ability to achieve a balanced intake and output will improve  Description: Ability to achieve a balanced intake and output will improve  11/24/2021 0321 by Eris Villanueva RN  Outcome: Ongoing     Problem: Urinary Elimination:  Goal: Ability to recognize the need to void and respond appropriately will improve  Description: Ability to recognize the need to void and respond appropriately will improve  Outcome: Ongoing

## 2021-11-24 NOTE — PROGRESS NOTES
Discharge instructions reviewed and signed. Patient educated on caro care with change to leg bag as needed. Will have lunch before leaving to home.

## 2021-11-24 NOTE — OP NOTE
[id number] [facility] [address]                [address] [place] [address]                                OPERATIVE REPORT    PATIENT NAME: Gabriel Harris                      :        1950  MED REC NO:   [id number]                             ROOM:       2018  ACCOUNT NO:   [de-identified]                           ADMIT DATE: 2021  PROVIDER:     Moni Fowler    DATE OF PROCEDURE:  2021    SURGEON:  Moni Fowler MD    ASSISTANT:  None. PREOPERATIVE DIAGNOSIS:  Prostate cancer. POSTOPERATIVE DIAGNOSIS:  Prostate cancer. PROCEDURE:  Robotic radical prostatectomy with bilateral pelvic lymph  node dissection. ANESTHESIA:  General.    COMPLICATIONS:  None. ESTIMATED BLOOD LOSS:  100 mL. NARRATIVE:  This is a 61-year-old white male who has a history of  Lily 7 adenocarcinoma of the prostate. He was given options for  treatment. He opted to have the above procedure performed. All the  risks and benefits were explained to the patient. Informed consent was  obtained. This 61-year-old white male was given heparin and antibiotics, and he  was taken back to the operating room. He was positioned in the supine  position. General anesthesia was started. He was then sterilely  prepped and draped in standard fashion. An 18-English Fleming catheter was  placed and the bladder was drained. He was placed in a Trendelenburg  position. He was sterilely prepped and draped, and a supraumbilical  skin incision was made. A Veress needle was placed through this into  the peritoneum. Pneumoperitoneum was obtained. An 8 mm port was placed  through this incision into the peritoneum. The peritoneum was examined. No injuries were noted. The usual ports were placed in the usual  fashion. Three 8-mm robotic ports were placed and two assistant ports  were placed, one 12 mm port, one 5 mm port. The robot was then docked.    The procedure was started by retracting the bladder in the usual fashion  by incising lateral to the medial umbilical ligaments and transecting  the urachus. The fat off the anterior aspect of the prostate was  excised. The endopelvic fascia was incised on each side. The dorsal  venous complex was ligated with a figure-of-eight stitch of 0 V-Loc. This was pexed to the pubic bone. The bladder neck was then transected  making sure not to injure the ureteral orifices and making sure not to  enter the prostate. The anterior Denonvillier's fascia was incised. The seminal vesicles and vasa were dissected and lifted anteriorly. Posterior Denonvillier's fascia was incised. The rectum was swept off  the posterolateral aspects of the prostate. Then, the prostatic  pedicles were ligated and divided with Hem-o-suni clips in the standard  nerve-sparing fashion towards the apex of the prostate. This was done  on both sides, and the dorsal venous complex was divided making sure not  to enter the prostate. The urethra was dissected and divided  maintaining urethral length. The rectourethralis was divided and the  prostate was free, and then the pelvis was irrigated. I made sure there  was adequate hemostasis, and made sure there were no injuries, and then  I performed a right pelvic lymph node dissection removing the lymph  nodes between the iliac vein and obturator nerve, making sure not to  injure these structures. The same was done on the left side, removing  the lymph nodes between the iliac vein and obturator nerve, making sure  not to injure these structures. The specimen which was the prostate,  seminal vesicles, and the lymph nodes were placed into an EndoCatch bag. I then performed a Mirza stitch with a 3-0 Monocryl in a figure-of-eight  stitch manner, bringing together posterior Denonvillier's fascia and the  rectourethralis. This was tied down.   The bladder-urethral anastomosis  was performed with a 2-0 Quill bringing together the bladder neck and  the urethra. This was done in a running fashion. This was tied down. A new 18-Malian Fleming catheter was placed, and the bladder was drained,  and then I undocked the robot. I elongated the umbilical skin incision. I removed the specimen bag through this incision, and it was sent as  permanent specimen to pathology. I closed the incision bringing  together the rectus fascia with a 0 PDS stitch in a running fashion. This was tied down. All the ports were removed, and all the skin  incisions were closed with 4-0 Monocryl in a subcuticular stitch manner. Dermabond was applied. All the skin incisions were infiltrated with  0.25% Marcaine and that was the end of the procedure. The patient will  be admitted for routine postoperative care. I was present and scrubbed  throughout the entirety of the case.         Gianni Riggs    D: 11/24/2021 12:25:35       T: 11/24/2021 12:30:50     [id number]  Job#: [id number]     Doc#: [id number]    CC:  James Lambert

## 2021-11-24 NOTE — DISCHARGE INSTR - DIET

## 2021-11-24 NOTE — CARE COORDINATION
Case Management Initial Discharge Plan  Eusebio Blackmon,             Met with:patient to discuss discharge plans. Information verified: address, contacts, phone number, , insurance Yes  PCP: Dr. Amanda Moore  Date of last visit:     Insurance Provider: Medicare, [facility] [place]    Discharge Planning    Living Arrangements:  Spouse/Significant Other   Support Systems:  None    Home has 1 stories  3 stairs to climb to get into front door, 0 stairs to climb to reach second floor  Location of bedroom/bathroom in home  - main floor    Patient able to perform ADL's:Independent    Current Services (outpatient & in home) none  DME equipment: none  DME provider: N/A    Pharmacy: [facility]    Potential Assistance Needed:  N/A    Patient agreeable to home care: No  [place] of choice provided:  n/a    Prior SNF/Rehab Placement and Facility: No  Agreeable to SNF/Rehab: No  [place] of choice provided: n/a   Evaluation: n/a    Expected Discharge date:  21  Patient expects to be discharged to:   home  Follow Up Appointment: Best Day/ Time:      Transportation provider: wife  Transportation arrangements needed for discharge: No    Readmission Risk              Risk of Unplanned Readmission:  7             Does patient have a readmission risk score greater than 14?: No  If yes, follow-up appointment must be made within 7 days of discharge. Goal of Care:       Discharge Plan: Met with patient at bedside. Lives with wife, independent and drives. POD #1 lap radical prostatectomy. Will D/C home with caro. Declines need for HHC.   Cystogram scheduled at [facility] - at 10am followed by post op appointment with Dr. Sky Covington at 11:20am.              Electronically signed by Mirza Jasso RN on 21 at 9:13 AM EST

## 2021-11-24 NOTE — PLAN OF CARE
Problem: Infection:  Goal: Will remain free from infection  Description: Will remain free from infection  11/24/2021 0800 by Roberto Medrano RN  Outcome: Ongoing  11/24/2021 0321 by Kp Lackey RN  Outcome: Ongoing     Problem: Safety:  Goal: Free from accidental physical injury  Description: Free from accidental physical injury  11/24/2021 0800 by Roberto Medrano RN  Outcome: Ongoing  11/24/2021 0321 by Kp Lackey RN  Outcome: Ongoing  Goal: Free from intentional harm  Description: Free from intentional harm  11/24/2021 0800 by Roberto Medrano RN  Outcome: Ongoing  11/24/2021 0321 by Kp Lackey RN  Outcome: Ongoing     Problem: Daily Care:  Goal: Daily care needs are met  Description: Daily care needs are met  11/24/2021 0800 by Roberto Medrano RN  Outcome: Ongoing  11/24/2021 0321 by Kp Lackey RN  Outcome: Ongoing     Problem: Pain:  Goal: Patient's pain/discomfort is manageable  Description: Patient's pain/discomfort is manageable  11/24/2021 0800 by Roberto Medrano RN  Outcome: Ongoing  11/24/2021 0321 by Kp Lackey RN  Outcome: Ongoing

## 2021-11-24 NOTE — PROGRESS NOTES
[facility]   Urology Progress Note            Subjective: follow-up robotic-assistedradical prostatectomy    Patient Vitals for the past 24 hrs:   BP Temp Temp src Pulse Resp SpO2   11/24/21 0727 (!) 103/53 98.6 °F (37 °C) Oral 68 16 96 %   11/24/21 0334 (!) 107/57 97.9 °F (36.6 °C) Oral 72 17 95 %   11/23/21 2334 (!) 110/58 97.9 °F (36.6 °C) Oral 106 16 94 %   11/23/21 1928 113/65 97.2 °F (36.2 °C) Oral 81 16 95 %   11/23/21 1645 126/69 97.4 °F (36.3 °C) -- 64 16 97 %   11/23/21 1600 134/79 97.9 °F (36.6 °C) -- 71 19 96 %   11/23/21 1545 127/75 -- -- 67 16 95 %   11/23/21 1530 128/82 -- -- 67 14 94 %   11/23/21 1515 123/74 -- -- 67 13 93 %   11/23/21 1500 125/79 -- -- 67 19 98 %   11/23/21 1445 123/77 97.9 °F (36.6 °C) Temporal 69 20 95 %       Intake/Output Summary (Last 24 hours) at 11/24/2021 1248  Last data filed at 11/24/2021 0636  Gross per 24 hour   Intake 4389 ml   Output 2675 ml   Net 1714 ml       Recent Labs     11/23/21  1451 11/24/21  0637   WBC 9.8 10.3   HGB 13.6 12.3*   HCT 41.5 37.7*   MCV 96.3 96.2    210     Recent Labs     11/23/21  1451 11/24/21  0637    139   K 4.4 4.5    109*   CO2 23 22   BUN 15 23   CREATININE 0.96 0.94       No results for input(s): COLORU, PHUR, LABCAST, WBCUA, RBCUA, MUCUS, TRICHOMONAS, YEAST, BACTERIA, CLARITYU, SPECGRAV, LEUKOCYTESUR, UROBILINOGEN, BILIRUBINUR, BLOODU in the last 72 hours. Invalid input(s): NITRATE, GLUCOSEUKETONESUAMORPHOUS    Additional Lab/culture results:    Physical Exam: patient alert oriented not in acute distress sitting at bedside patient's family present in the room, discussed with the patient indwelling Fleming, discharge planning, office follow-up,    Interval Imaging Findings:    Impression:    Patient Active Problem List   Diagnosis    Prostate cancer [facility])       Plan: plan for discharge from today, office follow-up.  One week regarding Fleming catheter removal and cystogram    Kala Mcallister José Miguel Casillas MD  12:48 PM 11/24/2021

## 2021-11-29 LAB — SURGICAL PATHOLOGY REPORT: NORMAL

## 2021-12-06 ENCOUNTER — HOSPITAL ENCOUNTER (OUTPATIENT)
Dept: VASCULAR LAB | Age: 71
Discharge: HOME | End: 2021-12-06
Payer: MEDICARE

## 2021-12-06 DIAGNOSIS — M79.662 PAIN OF LEFT CALF: Primary | ICD-10-CM

## 2021-12-06 PROCEDURE — 93971 EXTREMITY STUDY: CPT

## 2022-06-15 ENCOUNTER — APPOINTMENT (OUTPATIENT)
Dept: CT IMAGING | Age: 72
DRG: 176 | End: 2022-06-15
Payer: MEDICARE

## 2022-06-15 ENCOUNTER — HOSPITAL ENCOUNTER (INPATIENT)
Age: 72
LOS: 3 days | Discharge: HOME | DRG: 176 | End: 2022-06-18
Attending: STUDENT IN AN ORGANIZED HEALTH CARE EDUCATION/TRAINING PROGRAM | Admitting: FAMILY MEDICINE
Payer: MEDICARE

## 2022-06-15 DIAGNOSIS — I82.412 ACUTE DEEP VEIN THROMBOSIS (DVT) OF FEMORAL VEIN OF LEFT LOWER EXTREMITY (HCC): Primary | ICD-10-CM

## 2022-06-15 DIAGNOSIS — I26.99 ACUTE PULMONARY EMBOLISM, UNSPECIFIED PULMONARY EMBOLISM TYPE, UNSPECIFIED WHETHER ACUTE COR PULMONALE PRESENT (HCC): ICD-10-CM

## 2022-06-15 PROBLEM — O22.30 DVT (DEEP VEIN THROMBOSIS) IN PREGNANCY: Status: ACTIVE | Noted: 2022-06-15

## 2022-06-15 LAB
ABSOLUTE EOS #: 0.25 K/UL (ref 0–0.44)
ABSOLUTE IMMATURE GRANULOCYTE: 0.02 K/UL (ref 0–0.3)
ABSOLUTE LYMPH #: 1.73 K/UL (ref 1.1–3.7)
ABSOLUTE MONO #: 0.59 K/UL (ref 0.1–1.2)
ANION GAP SERPL CALCULATED.3IONS-SCNC: 9 MMOL/L (ref 9–17)
BASOPHILS # BLD: 0 % (ref 0–2)
BASOPHILS ABSOLUTE: 0.03 K/UL (ref 0–0.2)
BUN BLDV-MCNC: 19 MG/DL (ref 8–23)
BUN/CREAT BLD: 20 (ref 9–20)
CALCIUM SERPL-MCNC: 9.7 MG/DL (ref 8.6–10.4)
CHLORIDE BLD-SCNC: 105 MMOL/L (ref 98–107)
CO2: 25 MMOL/L (ref 20–31)
CREAT SERPL-MCNC: 0.93 MG/DL (ref 0.7–1.2)
EOSINOPHILS RELATIVE PERCENT: 4 % (ref 1–4)
GFR AFRICAN AMERICAN: >60 ML/MIN
GFR NON-AFRICAN AMERICAN: >60 ML/MIN
GFR SERPL CREATININE-BSD FRML MDRD: ABNORMAL ML/MIN/{1.73_M2}
GLUCOSE BLD-MCNC: 156 MG/DL (ref 70–99)
HCT VFR BLD CALC: 42.9 % (ref 40.7–50.3)
HEMOGLOBIN: 13.8 G/DL (ref 13–17)
IMMATURE GRANULOCYTES: 0 %
INR BLD: 1
LYMPHOCYTES # BLD: 25 % (ref 24–43)
MCH RBC QN AUTO: 30.9 PG (ref 25.2–33.5)
MCHC RBC AUTO-ENTMCNC: 32.2 G/DL (ref 28.4–34.8)
MCV RBC AUTO: 96.2 FL (ref 82.6–102.9)
MONOCYTES # BLD: 9 % (ref 3–12)
NRBC AUTOMATED: 0 PER 100 WBC
PARTIAL THROMBOPLASTIN TIME: 29.7 SEC (ref 23.9–33.8)
PDW BLD-RTO: 13.6 % (ref 11.8–14.4)
PLATELET # BLD: 174 K/UL (ref 138–453)
PMV BLD AUTO: 9.6 FL (ref 8.1–13.5)
POTASSIUM SERPL-SCNC: 4.2 MMOL/L (ref 3.7–5.3)
PRO-BNP: 127 PG/ML
PROTHROMBIN TIME: 13.4 SEC (ref 11.5–14.2)
RBC # BLD: 4.46 M/UL (ref 4.21–5.77)
SEG NEUTROPHILS: 62 % (ref 36–65)
SEGMENTED NEUTROPHILS ABSOLUTE COUNT: 4.36 K/UL (ref 1.5–8.1)
SODIUM BLD-SCNC: 139 MMOL/L (ref 135–144)
TROPONIN, HIGH SENSITIVITY: 18 NG/L (ref 0–22)
WBC # BLD: 7 K/UL (ref 3.5–11.3)

## 2022-06-15 PROCEDURE — 2060000000 HC ICU INTERMEDIATE R&B

## 2022-06-15 PROCEDURE — 96365 THER/PROPH/DIAG IV INF INIT: CPT

## 2022-06-15 PROCEDURE — 71260 CT THORAX DX C+: CPT

## 2022-06-15 PROCEDURE — 85730 THROMBOPLASTIN TIME PARTIAL: CPT

## 2022-06-15 PROCEDURE — 99285 EMERGENCY DEPT VISIT HI MDM: CPT

## 2022-06-15 PROCEDURE — 96366 THER/PROPH/DIAG IV INF ADDON: CPT

## 2022-06-15 PROCEDURE — 83880 ASSAY OF NATRIURETIC PEPTIDE: CPT

## 2022-06-15 PROCEDURE — 80048 BASIC METABOLIC PNL TOTAL CA: CPT

## 2022-06-15 PROCEDURE — 36415 COLL VENOUS BLD VENIPUNCTURE: CPT

## 2022-06-15 PROCEDURE — 84484 ASSAY OF TROPONIN QUANT: CPT

## 2022-06-15 PROCEDURE — 85610 PROTHROMBIN TIME: CPT

## 2022-06-15 PROCEDURE — 6360000002 HC RX W HCPCS: Performed by: STUDENT IN AN ORGANIZED HEALTH CARE EDUCATION/TRAINING PROGRAM

## 2022-06-15 PROCEDURE — 85025 COMPLETE CBC W/AUTO DIFF WBC: CPT

## 2022-06-15 PROCEDURE — 2580000003 HC RX 258: Performed by: HOSPITALIST

## 2022-06-15 PROCEDURE — 6360000004 HC RX CONTRAST MEDICATION: Performed by: HOSPITALIST

## 2022-06-15 RX ORDER — 0.9 % SODIUM CHLORIDE 0.9 %
80 INTRAVENOUS SOLUTION INTRAVENOUS ONCE
Status: COMPLETED | OUTPATIENT
Start: 2022-06-15 | End: 2022-06-16

## 2022-06-15 RX ORDER — SODIUM CHLORIDE 0.9 % (FLUSH) 0.9 %
10 SYRINGE (ML) INJECTION ONCE
Status: COMPLETED | OUTPATIENT
Start: 2022-06-15 | End: 2022-06-15

## 2022-06-15 RX ORDER — HEPARIN SODIUM 1000 [USP'U]/ML
40 INJECTION, SOLUTION INTRAVENOUS; SUBCUTANEOUS PRN
Status: DISCONTINUED | OUTPATIENT
Start: 2022-06-15 | End: 2022-06-16 | Stop reason: ALTCHOICE

## 2022-06-15 RX ORDER — HEPARIN SODIUM 1000 [USP'U]/ML
80 INJECTION, SOLUTION INTRAVENOUS; SUBCUTANEOUS ONCE
Status: COMPLETED | OUTPATIENT
Start: 2022-06-15 | End: 2022-06-15

## 2022-06-15 RX ORDER — HEPARIN SODIUM 1000 [USP'U]/ML
80 INJECTION, SOLUTION INTRAVENOUS; SUBCUTANEOUS PRN
Status: DISCONTINUED | OUTPATIENT
Start: 2022-06-15 | End: 2022-06-16 | Stop reason: ALTCHOICE

## 2022-06-15 RX ORDER — HEPARIN SODIUM 10000 [USP'U]/100ML
5-30 INJECTION, SOLUTION INTRAVENOUS CONTINUOUS
Status: DISCONTINUED | OUTPATIENT
Start: 2022-06-15 | End: 2022-06-16 | Stop reason: ALTCHOICE

## 2022-06-15 RX ADMIN — SODIUM CHLORIDE, PRESERVATIVE FREE 10 ML: 5 INJECTION INTRAVENOUS at 23:18

## 2022-06-15 RX ADMIN — SODIUM CHLORIDE 80 ML: 9 INJECTION, SOLUTION INTRAVENOUS at 23:18

## 2022-06-15 RX ADMIN — HEPARIN SODIUM AND DEXTROSE 17.14 UNITS/KG/HR: 10000; 5 INJECTION INTRAVENOUS at 21:06

## 2022-06-15 RX ADMIN — IOPAMIDOL 75 ML: 755 INJECTION, SOLUTION INTRAVENOUS at 23:17

## 2022-06-15 RX ADMIN — HEPARIN SODIUM 9800 UNITS: 1000 INJECTION INTRAVENOUS; SUBCUTANEOUS at 21:06

## 2022-06-15 ASSESSMENT — ENCOUNTER SYMPTOMS
SHORTNESS OF BREATH: 0
EYE DISCHARGE: 0
ABDOMINAL PAIN: 0
COLOR CHANGE: 0
BACK PAIN: 0
EYE REDNESS: 0

## 2022-06-16 PROBLEM — I26.99 PE (PULMONARY THROMBOEMBOLISM) (HCC): Status: ACTIVE | Noted: 2022-06-16

## 2022-06-16 LAB
ABSOLUTE EOS #: 0.34 K/UL (ref 0–0.44)
ABSOLUTE IMMATURE GRANULOCYTE: 0.03 K/UL (ref 0–0.3)
ABSOLUTE LYMPH #: 1.91 K/UL (ref 1.1–3.7)
ABSOLUTE MONO #: 0.73 K/UL (ref 0.1–1.2)
ANION GAP SERPL CALCULATED.3IONS-SCNC: 7 MMOL/L (ref 9–17)
ANTI-XA UNFRAC HEPARIN: 0.49 IU/L (ref 0.3–0.7)
ANTI-XA UNFRAC HEPARIN: >1.1 IU/L (ref 0.3–0.7)
BASOPHILS # BLD: 1 % (ref 0–2)
BASOPHILS ABSOLUTE: 0.04 K/UL (ref 0–0.2)
BUN BLDV-MCNC: 19 MG/DL (ref 8–23)
BUN/CREAT BLD: 19 (ref 9–20)
CALCIUM SERPL-MCNC: 9.3 MG/DL (ref 8.6–10.4)
CHLORIDE BLD-SCNC: 107 MMOL/L (ref 98–107)
CO2: 25 MMOL/L (ref 20–31)
CREAT SERPL-MCNC: 0.98 MG/DL (ref 0.7–1.2)
EOSINOPHILS RELATIVE PERCENT: 5 % (ref 1–4)
GFR AFRICAN AMERICAN: >60 ML/MIN
GFR NON-AFRICAN AMERICAN: >60 ML/MIN
GFR SERPL CREATININE-BSD FRML MDRD: ABNORMAL ML/MIN/{1.73_M2}
GLUCOSE BLD-MCNC: 106 MG/DL (ref 70–99)
HCT VFR BLD CALC: 38.1 % (ref 40.7–50.3)
HCT VFR BLD CALC: 40 % (ref 40.7–50.3)
HEMOGLOBIN: 12.3 G/DL (ref 13–17)
HEMOGLOBIN: 12.8 G/DL (ref 13–17)
IMMATURE GRANULOCYTES: 0 %
INR BLD: 1
LYMPHOCYTES # BLD: 29 % (ref 24–43)
MCH RBC QN AUTO: 30.8 PG (ref 25.2–33.5)
MCH RBC QN AUTO: 31.1 PG (ref 25.2–33.5)
MCHC RBC AUTO-ENTMCNC: 32 G/DL (ref 28.4–34.8)
MCHC RBC AUTO-ENTMCNC: 32.3 G/DL (ref 28.4–34.8)
MCV RBC AUTO: 96.2 FL (ref 82.6–102.9)
MCV RBC AUTO: 96.5 FL (ref 82.6–102.9)
MONOCYTES # BLD: 11 % (ref 3–12)
NRBC AUTOMATED: 0 PER 100 WBC
NRBC AUTOMATED: 0 PER 100 WBC
PARTIAL THROMBOPLASTIN TIME: 31.2 SEC (ref 23.9–33.8)
PDW BLD-RTO: 13.6 % (ref 11.8–14.4)
PDW BLD-RTO: 13.7 % (ref 11.8–14.4)
PLATELET # BLD: 164 K/UL (ref 138–453)
PLATELET # BLD: 165 K/UL (ref 138–453)
PMV BLD AUTO: 9 FL (ref 8.1–13.5)
PMV BLD AUTO: 9.8 FL (ref 8.1–13.5)
POTASSIUM SERPL-SCNC: 4.2 MMOL/L (ref 3.7–5.3)
PROTHROMBIN TIME: 13.5 SEC (ref 11.5–14.2)
RBC # BLD: 3.95 M/UL (ref 4.21–5.77)
RBC # BLD: 4.16 M/UL (ref 4.21–5.77)
SEG NEUTROPHILS: 54 % (ref 36–65)
SEGMENTED NEUTROPHILS ABSOLUTE COUNT: 3.63 K/UL (ref 1.5–8.1)
SODIUM BLD-SCNC: 139 MMOL/L (ref 135–144)
WBC # BLD: 6.1 K/UL (ref 3.5–11.3)
WBC # BLD: 6.7 K/UL (ref 3.5–11.3)

## 2022-06-16 PROCEDURE — 2060000000 HC ICU INTERMEDIATE R&B

## 2022-06-16 PROCEDURE — 6360000002 HC RX W HCPCS: Performed by: INTERNAL MEDICINE

## 2022-06-16 PROCEDURE — 2580000003 HC RX 258: Performed by: HOSPITALIST

## 2022-06-16 PROCEDURE — 85730 THROMBOPLASTIN TIME PARTIAL: CPT

## 2022-06-16 PROCEDURE — 80048 BASIC METABOLIC PNL TOTAL CA: CPT

## 2022-06-16 PROCEDURE — 93306 TTE W/DOPPLER COMPLETE: CPT

## 2022-06-16 PROCEDURE — 6370000000 HC RX 637 (ALT 250 FOR IP): Performed by: STUDENT IN AN ORGANIZED HEALTH CARE EDUCATION/TRAINING PROGRAM

## 2022-06-16 PROCEDURE — 85027 COMPLETE CBC AUTOMATED: CPT

## 2022-06-16 PROCEDURE — 85610 PROTHROMBIN TIME: CPT

## 2022-06-16 PROCEDURE — 94761 N-INVAS EAR/PLS OXIMETRY MLT: CPT

## 2022-06-16 PROCEDURE — 36415 COLL VENOUS BLD VENIPUNCTURE: CPT

## 2022-06-16 PROCEDURE — 85520 HEPARIN ASSAY: CPT

## 2022-06-16 PROCEDURE — 85025 COMPLETE CBC W/AUTO DIFF WBC: CPT

## 2022-06-16 RX ORDER — ONDANSETRON 2 MG/ML
4 INJECTION INTRAMUSCULAR; INTRAVENOUS EVERY 6 HOURS PRN
Status: DISCONTINUED | OUTPATIENT
Start: 2022-06-16 | End: 2022-06-18 | Stop reason: HOSPADM

## 2022-06-16 RX ORDER — MAGNESIUM SULFATE 1 G/100ML
1000 INJECTION INTRAVENOUS PRN
Status: DISCONTINUED | OUTPATIENT
Start: 2022-06-16 | End: 2022-06-18 | Stop reason: HOSPADM

## 2022-06-16 RX ORDER — HEPARIN SODIUM 1000 [USP'U]/ML
80 INJECTION, SOLUTION INTRAVENOUS; SUBCUTANEOUS PRN
Status: DISCONTINUED | OUTPATIENT
Start: 2022-06-16 | End: 2022-06-16

## 2022-06-16 RX ORDER — HEPARIN SODIUM 1000 [USP'U]/ML
80 INJECTION, SOLUTION INTRAVENOUS; SUBCUTANEOUS ONCE
Status: DISCONTINUED | OUTPATIENT
Start: 2022-06-16 | End: 2022-06-16

## 2022-06-16 RX ORDER — ONDANSETRON 4 MG/1
4 TABLET, ORALLY DISINTEGRATING ORAL EVERY 8 HOURS PRN
Status: DISCONTINUED | OUTPATIENT
Start: 2022-06-16 | End: 2022-06-18 | Stop reason: HOSPADM

## 2022-06-16 RX ORDER — HEPARIN SODIUM 10000 [USP'U]/100ML
5-30 INJECTION, SOLUTION INTRAVENOUS CONTINUOUS
Status: DISCONTINUED | OUTPATIENT
Start: 2022-06-16 | End: 2022-06-16

## 2022-06-16 RX ORDER — HYDROCODONE BITARTRATE AND ACETAMINOPHEN 5; 325 MG/1; MG/1
1 TABLET ORAL EVERY 6 HOURS PRN
Status: DISCONTINUED | OUTPATIENT
Start: 2022-06-16 | End: 2022-06-18 | Stop reason: HOSPADM

## 2022-06-16 RX ORDER — POTASSIUM CHLORIDE 7.45 MG/ML
10 INJECTION INTRAVENOUS PRN
Status: DISCONTINUED | OUTPATIENT
Start: 2022-06-16 | End: 2022-06-18 | Stop reason: HOSPADM

## 2022-06-16 RX ORDER — HEPARIN SODIUM 1000 [USP'U]/ML
40 INJECTION, SOLUTION INTRAVENOUS; SUBCUTANEOUS PRN
Status: DISCONTINUED | OUTPATIENT
Start: 2022-06-16 | End: 2022-06-16

## 2022-06-16 RX ORDER — MORPHINE SULFATE 2 MG/ML
1 INJECTION, SOLUTION INTRAMUSCULAR; INTRAVENOUS EVERY 4 HOURS PRN
Status: DISCONTINUED | OUTPATIENT
Start: 2022-06-16 | End: 2022-06-18 | Stop reason: HOSPADM

## 2022-06-16 RX ORDER — SODIUM CHLORIDE 9 MG/ML
INJECTION, SOLUTION INTRAVENOUS CONTINUOUS
Status: DISCONTINUED | OUTPATIENT
Start: 2022-06-16 | End: 2022-06-17

## 2022-06-16 RX ORDER — HYDRALAZINE HYDROCHLORIDE 20 MG/ML
10 INJECTION INTRAMUSCULAR; INTRAVENOUS EVERY 6 HOURS PRN
Status: DISCONTINUED | OUTPATIENT
Start: 2022-06-16 | End: 2022-06-18 | Stop reason: HOSPADM

## 2022-06-16 RX ORDER — SENNA PLUS 8.6 MG/1
1 TABLET ORAL 2 TIMES DAILY PRN
Status: DISCONTINUED | OUTPATIENT
Start: 2022-06-16 | End: 2022-06-18 | Stop reason: HOSPADM

## 2022-06-16 RX ORDER — HEPARIN SODIUM 1000 [USP'U]/ML
5000 INJECTION, SOLUTION INTRAVENOUS; SUBCUTANEOUS PRN
Status: DISCONTINUED | OUTPATIENT
Start: 2022-06-16 | End: 2022-06-17 | Stop reason: ALTCHOICE

## 2022-06-16 RX ORDER — ACETAMINOPHEN 650 MG/1
650 SUPPOSITORY RECTAL EVERY 6 HOURS PRN
Status: DISCONTINUED | OUTPATIENT
Start: 2022-06-16 | End: 2022-06-18 | Stop reason: HOSPADM

## 2022-06-16 RX ORDER — VITAMIN B COMPLEX
1 CAPSULE ORAL DAILY
COMMUNITY

## 2022-06-16 RX ORDER — SODIUM CHLORIDE 0.9 % (FLUSH) 0.9 %
10 SYRINGE (ML) INJECTION PRN
Status: DISCONTINUED | OUTPATIENT
Start: 2022-06-16 | End: 2022-06-18 | Stop reason: HOSPADM

## 2022-06-16 RX ORDER — SODIUM CHLORIDE 0.9 % (FLUSH) 0.9 %
10 SYRINGE (ML) INJECTION EVERY 12 HOURS SCHEDULED
Status: DISCONTINUED | OUTPATIENT
Start: 2022-06-16 | End: 2022-06-18 | Stop reason: HOSPADM

## 2022-06-16 RX ORDER — M-VIT,TX,IRON,MINS/CALC/FOLIC 27MG-0.4MG
1 TABLET ORAL DAILY
COMMUNITY

## 2022-06-16 RX ORDER — HEPARIN SODIUM 1000 [USP'U]/ML
10000 INJECTION, SOLUTION INTRAVENOUS; SUBCUTANEOUS PRN
Status: DISCONTINUED | OUTPATIENT
Start: 2022-06-16 | End: 2022-06-17 | Stop reason: ALTCHOICE

## 2022-06-16 RX ORDER — SODIUM CHLORIDE 9 MG/ML
INJECTION, SOLUTION INTRAVENOUS PRN
Status: DISCONTINUED | OUTPATIENT
Start: 2022-06-16 | End: 2022-06-18 | Stop reason: HOSPADM

## 2022-06-16 RX ORDER — HEPARIN SODIUM 10000 [USP'U]/100ML
5-30 INJECTION, SOLUTION INTRAVENOUS CONTINUOUS
Status: DISCONTINUED | OUTPATIENT
Start: 2022-06-16 | End: 2022-06-17 | Stop reason: ALTCHOICE

## 2022-06-16 RX ORDER — HEPARIN SODIUM 1000 [USP'U]/ML
10000 INJECTION, SOLUTION INTRAVENOUS; SUBCUTANEOUS ONCE
Status: COMPLETED | OUTPATIENT
Start: 2022-06-16 | End: 2022-06-16

## 2022-06-16 RX ORDER — DOCUSATE SODIUM 100 MG/1
100 CAPSULE, LIQUID FILLED ORAL 2 TIMES DAILY PRN
Status: DISCONTINUED | OUTPATIENT
Start: 2022-06-16 | End: 2022-06-18 | Stop reason: HOSPADM

## 2022-06-16 RX ORDER — ZOLPIDEM TARTRATE 5 MG/1
5 TABLET ORAL NIGHTLY PRN
Status: DISCONTINUED | OUTPATIENT
Start: 2022-06-16 | End: 2022-06-18 | Stop reason: HOSPADM

## 2022-06-16 RX ORDER — POTASSIUM CHLORIDE 20 MEQ/1
40 TABLET, EXTENDED RELEASE ORAL PRN
Status: DISCONTINUED | OUTPATIENT
Start: 2022-06-16 | End: 2022-06-18 | Stop reason: HOSPADM

## 2022-06-16 RX ORDER — ACETAMINOPHEN 325 MG/1
650 TABLET ORAL EVERY 6 HOURS PRN
Status: DISCONTINUED | OUTPATIENT
Start: 2022-06-16 | End: 2022-06-18 | Stop reason: HOSPADM

## 2022-06-16 RX ADMIN — SODIUM CHLORIDE: 9 INJECTION, SOLUTION INTRAVENOUS at 13:46

## 2022-06-16 RX ADMIN — SODIUM CHLORIDE: 9 INJECTION, SOLUTION INTRAVENOUS at 00:46

## 2022-06-16 RX ADMIN — HEPARIN SODIUM 10000 UNITS: 1000 INJECTION INTRAVENOUS; SUBCUTANEOUS at 19:58

## 2022-06-16 RX ADMIN — APIXABAN 10 MG: 5 TABLET, FILM COATED ORAL at 06:03

## 2022-06-16 RX ADMIN — HEPARIN SODIUM AND DEXTROSE 16.28 UNITS/KG/HR: 10000; 5 INJECTION INTRAVENOUS at 19:59

## 2022-06-16 ASSESSMENT — PAIN SCALES - GENERAL
PAINLEVEL_OUTOF10: 0
PAINLEVEL_OUTOF10: 0
PAINLEVEL_OUTOF10: 1

## 2022-06-16 ASSESSMENT — PAIN DESCRIPTION - LOCATION: LOCATION: LEG

## 2022-06-16 ASSESSMENT — PAIN DESCRIPTION - ORIENTATION: ORIENTATION: LEFT;LOWER

## 2022-06-16 ASSESSMENT — LIFESTYLE VARIABLES: HOW OFTEN DO YOU HAVE A DRINK CONTAINING ALCOHOL: NEVER

## 2022-06-16 NOTE — PROGRESS NOTES
Dr Jamey Wilburn came to ICU and informed Rn that he had already talked to Dr Blaine Barkley about the patient that there was no need for ICU staff to call him unless things change.

## 2022-06-16 NOTE — PROGRESS NOTES
RN spoke to Dr. Jolanta Hu regarding the PE and LLE DVT. He was aware of said scans and said he is ok with patient going home on Eloquis for 6 months. I made Dr. Dipti Morris aware. He ordered a pulm consult for which he wanted them to review the CT scan and get their opinion on Heparin or Eloquis. Perfect serve message sent to Pulm - Dr. Renae Cantu said to start on Heparin gtt for the night and he'll evaluate tomorrow.

## 2022-06-16 NOTE — PROGRESS NOTES
Transitions of Care Pharmacy Service   Medication Review    The patient's list of current home medications has been reviewed. He does not take any prescription maintenance meds at home, only OTC products. Source(s) of information: patient, Surescripts refill report      Please feel free to call me with any questions about this encounter. Thank you.     Anil Guillermo, [facility]   Transitions of Care Pharmacy Service  Phone:  [phone]  Fax: [phone]      Electronically signed by Anil Guillermo [facility] on 6/16/2022 at 6:52 PM           Medications Prior to Admission:   Coenzyme Q10 (CO Q10 PO), Take 1 tablet by mouth daily  b complex vitamins capsule, Take 1 capsule by mouth daily  Multiple Vitamins-Minerals (THERAPEUTIC MULTIVITAMIN-MINERALS) tablet, Take 1 tablet by mouth daily  Ascorbic Acid (VITAMIN C PO), Take 1 tablet by mouth daily  TURMERIC PO, Take 1 capsule by mouth daily

## 2022-06-16 NOTE — PROGRESS NOTES
Order for 10 mg of Eliquis twice daily per my discussion with vascular surgeon Talita Cramer was placed as well as repeat admission order with correct diagnosis

## 2022-06-16 NOTE — ED PROVIDER NOTES
[facility]  Emergency Department Encounter     Pt Name: Fawn Hopper  MRN: [id number]  [place] 1950  Date of evaluation: 6/15/22  PCP:  Laura Loomis MD    [address] [place] [address]       Chief Complaint   Patient presents with    Leg Swelling     doppler done at [facility] showing DVT in LLE, sent by Dr Carla Dennison for admission        HISTORY OFPRESENT ILLNESS  (Location/Symptom, Timing/Onset, Context/Setting, Quality, Duration, Modifying Factors,Severity.)      Fawn Hopper is a 81 y.o. male who presents with prostate cancer history with left lower leg swelling. Leg swelling started few days ago. Sent for DVT study by PCP performed at [facility] and was found to have an extensive DVT going up to the mid femoral vein. PCP: Told patient to go to ER for admission given extensive DVT. He has minimal left lower extremity pain. Minimal cramping. Pain is worse with ambulation. He has otherwise quite healthy does have a history of prostate cancer with prostatectomy. Denies any history for DVT, PE, recent long travel, or exogenous hormone use. PAST MEDICAL / SURGICAL / SOCIAL / FAMILY HISTORY      has a past medical history of Arthritis, Cancer ([address] [place] (hard of hearing), and Prostate cancer ([facility] [address] has a past surgical history that includes malignant skin lesion excision (Left, 10/05/2016); joint replacement (Left); Colonoscopy; Prostate biopsy; and Prostatectomy (N/A, 11/23/2021).     Social History     Socioeconomic History    Marital status:      Spouse name: Not on file    Number of children: Not on file    Years of education: Not on file    Highest education level: Not on file   Occupational History    Not on file   Tobacco Use    Smoking status: Never Smoker    Smokeless tobacco: Never Used   Vaping Use    Vaping Use: Never used   Substance and Sexual Activity    Alcohol use: No    Drug use: No    Sexual activity: Not on file   Other Topics Concern    Not on file Social History Narrative    Not on file     Social Determinants of Health     Financial Resource Strain:     Difficulty of Paying Living Expenses: Not on file   Food Insecurity:     Worried About Running Out of Food in the Last Year: Not on file    Sandra of Food in the Last Year: Not on file   Transportation Needs:     Lack of Transportation (Medical): Not on file    Lack of Transportation (Non-Medical): Not on file   Physical Activity:     Days of Exercise per Week: Not on file    Minutes of Exercise per Session: Not on file   Stress:     Feeling of Stress : Not on file   Social Connections:     Frequency of Communication with Friends and Family: Not on file    Frequency of Social Gatherings with Friends and Family: Not on file    Attends Islam Services: Not on file    Active Member of [address] [place] [address] [employer] or Organizations: Not on file    Attends Club or Organization Meetings: Not on file    Marital Status: Not on file   Intimate Partner Violence:     Fear of Current or Ex-Partner: Not on file    Emotionally Abused: Not on file    Physically Abused: Not on file    Sexually Abused: Not on file   Housing Stability:     Unable to Pay for Housing in the Last Year: Not on file    Number of [place] in the Last Year: Not on file    Unstable Housing in the Last Year: Not on file       Family History   Problem Relation Age of Onset    No Known Problems Mother     Other Father        Allergies:  Patient has no known allergies. Home Medications:  Prior to Admission medications    Not on File       REVIEW OF SYSTEMS    (2-9 systems for level 4, 10 or more for level 5)      Review of Systems   Constitutional: Negative for chills and fever. Eyes: Negative for discharge and redness. Respiratory: Negative for shortness of breath. Cardiovascular: Positive for leg swelling. Gastrointestinal: Negative for abdominal pain. Genitourinary: Negative for dysuria and flank pain.    Musculoskeletal: Negative for arthralgias and back pain. Skin: Negative for color change and rash. Allergic/Immunologic: Negative for environmental allergies. Neurological: Negative for headaches. Psychiatric/Behavioral: Negative for agitation and confusion. PHYSICAL EXAM   (up to 7 for level 4, 8 or more for level 5)     INITIAL VITALS:    height is 6' 5\" (1.956 m) and weight is 284 lb 6.4 oz (129 kg). His oral temperature is 98.4 °F (36.9 °C). His blood pressure is 121/77 and his pulse is 76. His respiration is 12 and oxygen saturation is 94%. Physical Exam  Vitals and nursing note reviewed. Constitutional:       Appearance: He is well-developed. HENT:      Head: Normocephalic and atraumatic. Nose: Nose normal.      Mouth/Throat:      Mouth: Mucous membranes are moist.   Eyes:      General: No scleral icterus. Conjunctiva/sclera: Conjunctivae normal.      Pupils: Pupils are equal, round, and reactive to light. Cardiovascular:      Rate and Rhythm: Normal rate and regular rhythm. Heart sounds: Normal heart sounds. No murmur heard. No friction rub. No gallop. Pulmonary:      Effort: Pulmonary effort is normal. No respiratory distress. Breath sounds: Normal breath sounds. No wheezing or rales. Musculoskeletal:      Comments: Left lower extremity with erythema, swelling compared to right, worsening ankle, calf tenderness   Skin:     General: Skin is warm and dry. Findings: No erythema or rash. Neurological:      Mental Status: He is alert and oriented to person, place, and time.    Psychiatric:         Behavior: Behavior normal.         DIFFERENTIAL  DIAGNOSIS     PLAN (LABS / IMAGING / EKG):  Orders Placed This Encounter   Procedures    CT CHEST PULMONARY EMBOLISM W CONTRAST    Basic Metabolic Panel    CBC with Auto Differential    Troponin    Brain Natriuretic Peptide    Protime-INR    APTT    CBC    Basic Metabolic Panel w/ Reflex to MG    CBC auto differential    Anti-Xa, Unfractionated Heparin    ADULT DIET;  Regular    Vital signs per unit routine    Notify physician    Up as tolerated    Daily weights    Intake and output    Full Code    Inpatient consult to Primary Care Provider    Inpatient consult to Social Work    Inpatient consult to Vascular Surgery    Inpatient consult to Vascular Surgery    OT eval and treat    PT evaluation and treat    Nasal Cannula oxygen    Pulse oximetry, continuous    Insert peripheral IV    ADMIT TO INPATIENT       MEDICATIONS ORDERED:  Orders Placed This Encounter   Medications    heparin (porcine) injection 9,800 Units    heparin (porcine) injection 9,800 Units    heparin (porcine) injection 4,900 Units    heparin 25,000 units in dextrose 5% 250 mL (premix) infusion    zolpidem (AMBIEN) tablet 5 mg    hydrALAZINE (APRESOLINE) injection 10 mg    docusate sodium (COLACE) capsule 100 mg    HYDROcodone-acetaminophen (NORCO) 5-325 MG per tablet 1 tablet    morphine (PF) injection 1 mg    0.9 % sodium chloride infusion    sodium chloride flush 0.9 % injection 10 mL    sodium chloride flush 0.9 % injection 10 mL    0.9 % sodium chloride infusion    OR Linked Order Group     potassium chloride (KLOR-CON M) extended release tablet 40 mEq     potassium bicarb-citric acid (EFFER-K) effervescent tablet 40 mEq     potassium chloride 10 mEq/100 mL IVPB (Peripheral Line)    magnesium sulfate 1000 mg in dextrose 5% 100 mL IVPB    OR Linked Order Group     ondansetron (ZOFRAN-ODT) disintegrating tablet 4 mg     ondansetron (ZOFRAN) injection 4 mg    senna (SENOKOT) tablet 8.6 mg    OR Linked Order Group     acetaminophen (TYLENOL) tablet 650 mg     acetaminophen (TYLENOL) suppository 650 mg    0.9 % sodium chloride bolus    iopamidol (ISOVUE-370) 76 % injection 75 mL    sodium chloride flush 0.9 % injection 10 mL       DDX: DVT versus PE    Initial MDM/Plan: 81 y.o. male who presents with significant DVT traveling up to mid femoral and proximal femoral.  Will start on heparin infusion given extensive DVT and sent from PCP. Check labs. DIAGNOSTIC RESULTS / EMERGENCY DEPARTMENT COURSE / MDM     LABS:  Labs Reviewed   BASIC METABOLIC PANEL - Abnormal; Notable for the following components:       Result Value    Glucose 156 (*)     All other components within normal limits   CBC WITH AUTO DIFFERENTIAL   TROPONIN   BRAIN NATRIURETIC PEPTIDE   PROTIME-INR   APTT   ANTI-XA, UNFRACTIONATED HEPARIN   ANTI-XA, UNFRACTIONATED HEPARIN   BASIC METABOLIC PANEL W/ REFLEX TO MG FOR LOW K   CBC WITH AUTO DIFFERENTIAL         RADIOLOGY:  CT CHEST PULMONARY EMBOLISM W CONTRAST    Result Date: 6/16/2022  1. Significantly motion limited exam.  There is acute pulmonary embolus in the right main pulmonary artery extending into the segmental right lower lobe and possibly a right middle lobe pulmonary arteries. No definite left-sided pulmonary embolus. 2. Dilatation of the right ventricle with respect to the left suggestive of right heart strain. 3. Nonspecific bibasilar curvilinear and ground-glass opacities which may be related to at atelectasis but can also be seen in the setting of recent viral infection. Critical results were called by Dr. Allison Craven MD to Dr. Gianluca Malave on 6/16/2022 at 00:26. EMERGENCY DEPARTMENT COURSE:  ED Course as of 06/16/22 0034   Wed Edson 15, 2022   2327 PCP requesting PE study. Patient admitted. [MS]      ED Course User Index  [MS] Tony Sharpe DO     CT PE was obtained at request of PCP. Does have a pulmonary embolus in the right main pulmonary artery. Patient again has normal troponin and normal BNP. I did discuss with Dr. Edilma Patten to alert him to this patient. He is requesting 10 mg twice daily Eliquis and can transition off of heparin drip. We will discuss with nursing as patient was just transported to floor.     PROCEDURES:  None    CONSULTS:  IP CONSULT TO PRIMARY CARE PROVIDER  IP CONSULT TO SOCIAL WORK  IP CONSULT TO VASCULAR SURGERY  IP CONSULT TO VASCULAR SURGERY    CRITICAL CARE:  Due to the high probability of sudden and clinically significant deterioration in the patient's condition patient required highest level of my preparedness to intervene urgently. I provided critical care time including documentation time, medication orders and management, reevaluation, vital sign assessment, ordering and reviewing of of lab tests ordering and reviewing of x-ray studies, and admission orders. Aggregate critical care time is 30 minutes including only time during which I was engaged in work directly related to patient care and did not include time spent treating other patients simultaneously. FINAL IMPRESSION      1. Acute deep vein thrombosis (DVT) of femoral vein of left lower extremity (HCC)    2. Acute pulmonary embolism, unspecified pulmonary embolism type, unspecified whether acute cor pulmonale present ([facility] [address]          DISPOSITION / PLAN     DISPOSITION Admitted 06/15/2022 10:58:46 PM        PATIENTREFERRED TO:  No follow-up provider specified. DISCHARGE MEDICATIONS:  There are no discharge medications for this patient.       Nhung Yates DO  EmergencyMedicine Attending    (Please note that portions of this note were completed with a voice recognition program.  Efforts were made to edit the dictations but occasionally words are mis-transcribed.)       Nhung Yates DO  06/16/22 0034

## 2022-06-16 NOTE — ED NOTES
Pt arrived to the ED w/ spouse for c/o blood clot to the left leg  Pt went and received an out patient doppler and found to have a bl;ood clot  Pt was called Adil and was told to come to the ED for A direct admit  Pt wheeled to the assigned room from triage  Pt rates pain a 5/10 at this time  RR is even and non-labored, NAD noticed at this time  Pt placed on full cardiac monitor  Iv established and labs drawn      Fiordaliza Umana RN  06/15/22 2016

## 2022-06-16 NOTE — H&P
History & Physical  [facility]    Adult Hospitalist      Name: Dustin King  MRN: [id number]     Acct: [de-identified]  Room: [facility]-    Admit Date: 6/15/2022  8:00 PM  PCP: Thi Johnson MD    Primary Problem  Principal Problem:    DVT (deep vein thrombosis) in pregnancy  Active Problems:    PE (pulmonary thromboembolism) ([address]  Resolved Problems:    * No resolved hospital problems.  *        Assesment:   Acute hypoxemic respiratory failure  Acute pulmonary embolism, extensive  Lower extremity DVT  History of prostate cancer      Plan:   Patient is admitted to medical ICU  Give aspirin more than 90%  Elevated Accu-Chek was occultly  CBC BMP daily  Echocardiogram showed ejection fraction 60%, no signs of right heart strain  IV heparin switched to oral Eliquis  Vascular surgery following  Consulted pulmonology  Continue other medication as below    Scheduled Meds:   sodium chloride flush  10 mL IntraVENous 2 times per day    apixaban  10 mg Oral BID    Followed by   Brii Angel ON 6/23/2022] apixaban  5 mg Oral BID     Continuous Infusions:   sodium chloride 75 mL/hr at 06/16/22 1346    sodium chloride       PRN Meds:  zolpidem, 5 mg, Nightly PRN  hydrALAZINE, 10 mg, Q6H PRN  docusate sodium, 100 mg, BID PRN  HYDROcodone-acetaminophen, 1 tablet, Q6H PRN  morphine, 1 mg, Q4H PRN  sodium chloride flush, 10 mL, PRN  sodium chloride, , PRN  potassium chloride, 40 mEq, PRN   Or  potassium alternative oral replacement, 40 mEq, PRN   Or  potassium chloride, 10 mEq, PRN  magnesium sulfate, 1,000 mg, PRN  ondansetron, 4 mg, Q8H PRN   Or  ondansetron, 4 mg, Q6H PRN  senna, 1 tablet, BID PRN  acetaminophen, 650 mg, Q6H PRN   Or  acetaminophen, 650 mg, Q6H PRN        Chief Complaint:     Chief Complaint   Patient presents with    Leg Swelling     doppler done at [facility] showing DVT in LLE, sent by Dr Emanuel Olguin for admission          History of Present Illness:      Dustin King is a 81 y.o.  male who presents with Leg Swelling (doppler done at [facility] showing DVT in LLE, sent by Dr Jonathan Means for admission )  This is 74-year-old gentleman has been admitted by emergency room, patient came to the ER with a complaint of having left leg swelling, patient has past medical history significant for prostate cancer, patient noticed that his leg is more swollen for the past few days, further patient was sent by primary care physician for a DVT study which was performed at [facility], noticed to have extensive DVT going up to the mid femoral vein, came to the emergency room for this reason, patient denies any prior history of DVT or PE, denies any history of recent long travel, patient further underwent CTA chest in the emergency room showed acute pulmonary embolism in the right main pulmonary artery extending into the segmental right lower lobe and possibly right middle lobe pulmonary arteries with some nonspecific groundglass opacities, patient is admitted for further management    I have personally reviewed the past medical history, past surgical history, medications, social history, and family history, and summarized in the note. Review of Systems:     All 10 point system is reviewed and negative otherwise mentioned in HPI. Past Medical History:     Past Medical History:   Diagnosis Date    Arthritis     Cancer ([facility]     melanoma    [place] (hard of hearing)     bilateral    Prostate cancer [facility])         Past Surgical History:     Past Surgical History:   Procedure Laterality Date    COLONOSCOPY      JOINT REPLACEMENT Left     hip    MALIGNANT SKIN LESION EXCISION Left 10/05/2016    left back melanoma- Dr Schaeffer [place] N/A 11/23/2021    RADICAL PROSTATECTOMY WITH PELVIC LYMPH NODE DISSECTION   LAPAROSCOPIC ROBOTIC XI performed by Hnery Moore MD at [facility]        Medications Prior to Admission:       Prior to Admission medications    Medication Sig Start Date End Date Taking?  Authorizing Provider   Coenzyme Q10 (CO Q10 PO) Take 1 tablet by mouth daily   Yes Historical Provider, MD   b complex vitamins capsule Take 1 capsule by mouth daily   Yes Historical Provider, MD   Multiple Vitamins-Minerals (THERAPEUTIC MULTIVITAMIN-MINERALS) tablet Take 1 tablet by mouth daily   Yes Historical Provider, MD   Ascorbic Acid (VITAMIN C PO) Take 1 tablet by mouth daily   Yes Historical Provider, MD   TURMERIC PO Take 1 capsule by mouth daily   Yes Historical Provider, MD        Allergies:       Patient has no known allergies. Social History:     Tobacco:    reports that he has never smoked. He has never used smokeless tobacco.  Alcohol:      reports no history of alcohol use. Drug Use:  reports no history of drug use.     Family History:     Family History   Problem Relation Age of Onset    No Known Problems Mother     Other Father          Physical Exam:     Vitals:  /63   Pulse 67   Temp 97.8 °F (36.6 °C) (Temporal)   Resp 20   Ht 6' 5\" (1.956 m)   Wt 284 lb 6.4 oz (129 kg)   SpO2 96%   BMI 33.72 kg/m²   Temp (24hrs), Av.9 °F (36.6 °C), Min:97.5 °F (36.4 °C), Max:98.4 °F (36.9 °C)          General appearance - alert, well appearing, and in no acute distress  Mental status - oriented to person, place, and time with normal affect  Head - normocephalic and atraumatic  Eyes - pupils equal and reactive, extraocular eye movements intact, conjunctiva clear  Ears - hearing appears to be intact  Nose - no drainage noted  Mouth - mucous membranes moist  Neck - supple, no carotid bruits, thyroid not palpable  Chest - clear to auscultation, normal effort  Heart - normal rate, regular rhythm, no murmur  Abdomen - soft, nontender, nondistended, bowel sounds present all four quadrants, no masses, hepatomegaly or splenomegaly  Neurological - normal speech, no focal findings or movement disorder noted, cranial nerves II through XII grossly intact  Extremities - peripheral pulses palpable, no pedal edema or calf pain with palpation  Skin - no gross lesions, rashes, or induration noted        Data:     Labs:    Hematology:  Recent Labs     06/15/22  2102 06/16/22  0326   WBC 7.0 6.7   RBC 4.46 3.95*   HGB 13.8 12.3*   HCT 42.9 38.1*   MCV 96.2 96.5   MCH 30.9 31.1   MCHC 32.2 32.3   RDW 13.6 13.6    164   MPV 9.6 9.8   INR 1.0  --      Chemistry:  Recent Labs     06/15/22  2102 06/16/22  0326    139   K 4.2 4.2    107   CO2 25 25   GLUCOSE 156* 106*   BUN 19 19   CREATININE 0.93 0.98   ANIONGAP 9 7*   LABGLOM >60 >60   GFRAA >60 >60   CALCIUM 9.7 9.3   PROBNP 127  --    TROPHS 18  --      No results for input(s): PROT, LABALBU, LABA1C, D4TPIPG, D0EPEUS, FT4, TSH, AST, ALT, LDH, GGT, ALKPHOS, LABGGT, BILITOT, BILIDIR, AMMONIA, AMYLASE, LIPASE, LACTATE, CHOL, HDL, LDLCHOLESTEROL, CHOLHDLRATIO, TRIG, VLDL, INR09LL, PHENYTOIN, PHENYF, URICACID, POCGLU in the last 72 hours. Lab Results   Component Value Date    INR 1.0 06/15/2022    PROTIME 13.4 06/15/2022       Lab Results   Component Value Date/Time    SPECIAL NOT REPORTED 11/16/2021 02:27 PM     Lab Results   Component Value Date/Time    CULTURE NO SIGNIFICANT GROWTH 11/16/2021 02:27 PM       No results found for: POCPH, PHART, PH, POCPCO2, JVV3FVK, PCO2, POCPO2, PO2ART, PO2, POCHCO3, ACW1DJA, HCO3, NBEA, PBEA, BEART, BE, THGBART, THB, DDL7HDO, XGUT6HVD, Y1APBTMW, O2SAT, FIO2    Radiology:    CT CHEST PULMONARY EMBOLISM W CONTRAST    Result Date: 6/16/2022  1. Significantly motion limited exam.  There is acute pulmonary embolus in the right main pulmonary artery extending into the segmental right lower lobe and possibly a right middle lobe pulmonary arteries. No definite left-sided pulmonary embolus. 2. Findings suggestive of right heart strain. 3. Nonspecific bibasilar curvilinear and ground-glass opacities which may be related to at atelectasis but can also be seen in the setting of recent viral infection.  Critical results were called by  Huy King MD to Dr. Kailey Hyde on 6/16/2022 at 00:26. All radiological studies reviewed                Code Status:  Full Code    Electronically signed by Chiquis Zaman MD on 6/16/2022 at 5:35 PM     Copy sent to Dr. Didier Ahuja MD    This note was created with the assistance of a speech-recognition program.  Although the intention is to generate a document that actually reflects the content of the visit, no guarantees can be provided that every mistake has been identified and corrected by editing. Note was updated later by me after  physical examination and  completion of the assessment.

## 2022-06-16 NOTE — CARE COORDINATION
Case Management Initial Discharge Plan  Susie Sanchez,             Met with:patient to discuss discharge plans. Information verified: address, contacts, phone number, , insurance Yes  Insurance Provider: Medicare  [place]: No    Emergency Contact/Next of Lucille Chinchilla name & number: spouse/Carie   [phone]  Who are involved in patient's support system? family    PCP: Judi Pérez MD  Date of last visit: January. Has appt on       Discharge Planning    Living Arrangements:        Home has 1 stories  3 stairs to climb to get into front door, 0stairs to climb to reach second floor  Location of bedroom/bathroom in home main    Patient able to perform ADL's:Independent    Current Services (outpatient & in home) none  DME equipment: 0  DME provider: 0    Is patient receiving oral anticoagulation therapy? No    If indicated:   Physician managing anticoagulation treatment:   Where does patient obtain lab work for ATC treatment? Does patient have any issues/concerns obtaining medications? No  If yes, what are patient's concerns? Is there a preferred Pharmacy after hours or on weekends? Yes    If yes, which pharmacy? [facility] in [place]    Potential Assistance Needed:       Patient agreeable to home care: No  [place] of choice provided:  n/a    Prior SNF/Rehab Placement and Facility: n/a  Agreeable to SNF/Rehab: No  [place] of choice provided: n/a     Evaluation: no    Expected Discharge date:       Patient expects to be discharged to: If home: is the family and/or caregiver wiling & able to provide support at home? yes  Who will be providing this support?  Family and self    Follow Up Appointment: Best Day/ Time:      Transportation provider: family  Transportation arrangements needed for discharge: No    Readmission Risk              Risk of Unplanned Readmission:  11             Does patient have a readmission risk score greater than 14?: No  If yes, follow-up appointment must be made within 7 days of discharge. Goals of Care:       Educated patient on transitional options, provided freedom of choice and are agreeable with plan      Discharge Plan: DVT  Patient lives with spouse in a 1 story home with 3 steps to enter. Declines any skilled needs. Independent. Patient to be put on Eliquis.    Has appt with Dr. Kalee Valdez on 6/20          Electronically signed by Elizabeth Treviño, RN on 6/16/22 at 12:10 PM EDT

## 2022-06-16 NOTE — PROGRESS NOTES
Talked to Dr. Jose Luis Noguera in regards to Heparin VS Eliquis. Per . Danitza Mejia hold Eliquis tonight and in the morning. Start Heparin with a plan on stopping in the morning and sending him home after he evaluates the patinet. perform ECHO tomorrow ( Echo was done 6/16 @ 1400. Read orders back to  and verified.

## 2022-06-16 NOTE — ED NOTES
ED to inpatient nurses report     Chief Complaint   Patient presents with    Leg Swelling     doppler done at [facility] showing DVT in LLE, sent by Dr Farhana Alejandro for admission       Present to ED sent form [facility] by Dr. Farhana Alejandro for DVT  LOC: alert and orientated to name, place, date  Vital signs   Vitals:    06/15/22 1953 06/15/22 2004 06/15/22 2018 06/15/22 2100   BP: 124/72 132/74  111/65   Pulse: 84 84 88 83   Resp: 18 16 21 17   Temp: 98.2 °F (36.8 °C)      SpO2: 96% 96% 96% 94%   Weight: 270 lb (122.5 kg)      Height: 6' 5\" (1.956 m)         Oxygen Baseline RA    Current needs required none  LDAs:   Peripheral IV 06/15/22 Right Antecubital (Active)   Site Assessment Clean, dry & intact 06/15/22 2010   Line Status Blood return noted 06/15/22 2010   Phlebitis Assessment No symptoms 06/15/22 2010   Infiltration Assessment 0 06/15/22 2010   Dressing Status New dressing applied 06/15/22 2010   Dressing Type Transparent 06/15/22 2010     Mobility: Independent  Pending ED orders:   Present condition: Stable  Code Status: Full  Consults: IP CONSULT TO PRIMARY CARE PROVIDER  [x]  Hospitalist  Completed  [x] yes [] no Who: Adil   []  Medicine  Completed  [] yes [] No Who:   []  Cardiology  Completed  [] yes [] No Who:   []  GI   Completed  [] yes [] No Who:   []  Neurology  Completed  [] yes [] No Who:   []  Nephrology Completed  [] yes [] No Who:    []  Vascular  Completed  [] yes [] No Who:   []  Ortho  Completed  [] yes [] No Who:     []  Surgery  Completed  [] yes [] No Who:    []  Urology  Completed  [] yes [] No Who:    []  CT Surgery Completed  [] yes [] No Who:   []  Podiatry  Completed  [] yes [] No Who:    []  Other    Completed  [] yes [] No Who:  Interventions: IV, Bloodwork, Heparin  Important Events: [place]-- only has one hearing aid at the moment.  DVT left leg       Electronically signed by Gumaro Bautista RN on 6/15/2022 at 9:58 PM     Gumaro Bautista RN  06/15/22 8650

## 2022-06-17 LAB
ABSOLUTE EOS #: 0.37 K/UL (ref 0–0.44)
ABSOLUTE IMMATURE GRANULOCYTE: 0.01 K/UL (ref 0–0.3)
ABSOLUTE LYMPH #: 2.09 K/UL (ref 1.1–3.7)
ABSOLUTE MONO #: 0.66 K/UL (ref 0.1–1.2)
ANION GAP SERPL CALCULATED.3IONS-SCNC: 7 MMOL/L (ref 9–17)
BASOPHILS # BLD: 1 % (ref 0–2)
BASOPHILS ABSOLUTE: 0.05 K/UL (ref 0–0.2)
BUN BLDV-MCNC: 14 MG/DL (ref 8–23)
BUN/CREAT BLD: 16 (ref 9–20)
CALCIUM SERPL-MCNC: 8.7 MG/DL (ref 8.6–10.4)
CHLORIDE BLD-SCNC: 107 MMOL/L (ref 98–107)
CO2: 24 MMOL/L (ref 20–31)
CREAT SERPL-MCNC: 0.86 MG/DL (ref 0.7–1.2)
EKG ATRIAL RATE: 63 BPM
EKG P AXIS: 50 DEGREES
EKG P-R INTERVAL: 208 MS
EKG Q-T INTERVAL: 412 MS
EKG QRS DURATION: 82 MS
EKG QTC CALCULATION (BAZETT): 421 MS
EKG R AXIS: 81 DEGREES
EKG T AXIS: 38 DEGREES
EKG VENTRICULAR RATE: 63 BPM
EOSINOPHILS RELATIVE PERCENT: 6 % (ref 1–4)
GFR AFRICAN AMERICAN: >60 ML/MIN
GFR NON-AFRICAN AMERICAN: >60 ML/MIN
GFR SERPL CREATININE-BSD FRML MDRD: ABNORMAL ML/MIN/{1.73_M2}
GLUCOSE BLD-MCNC: 120 MG/DL (ref 70–99)
HCT VFR BLD CALC: 37 % (ref 40.7–50.3)
HEMOGLOBIN: 12.1 G/DL (ref 13–17)
IMMATURE GRANULOCYTES: 0 %
LYMPHOCYTES # BLD: 33 % (ref 24–43)
MCH RBC QN AUTO: 31.1 PG (ref 25.2–33.5)
MCHC RBC AUTO-ENTMCNC: 32.7 G/DL (ref 28.4–34.8)
MCV RBC AUTO: 95.1 FL (ref 82.6–102.9)
MONOCYTES # BLD: 10 % (ref 3–12)
NRBC AUTOMATED: 0 PER 100 WBC
PARTIAL THROMBOPLASTIN TIME: 145.6 SEC (ref 23.9–33.8)
PARTIAL THROMBOPLASTIN TIME: 37.5 SEC (ref 23.9–33.8)
PDW BLD-RTO: 13.6 % (ref 11.8–14.4)
PLATELET # BLD: 146 K/UL (ref 138–453)
PMV BLD AUTO: 9.2 FL (ref 8.1–13.5)
POTASSIUM SERPL-SCNC: 4.3 MMOL/L (ref 3.7–5.3)
RBC # BLD: 3.89 M/UL (ref 4.21–5.77)
SEG NEUTROPHILS: 50 % (ref 36–65)
SEGMENTED NEUTROPHILS ABSOLUTE COUNT: 3.19 K/UL (ref 1.5–8.1)
SODIUM BLD-SCNC: 138 MMOL/L (ref 135–144)
WBC # BLD: 6.4 K/UL (ref 3.5–11.3)

## 2022-06-17 PROCEDURE — 2060000000 HC ICU INTERMEDIATE R&B

## 2022-06-17 PROCEDURE — 2580000003 HC RX 258: Performed by: HOSPITALIST

## 2022-06-17 PROCEDURE — 93005 ELECTROCARDIOGRAM TRACING: CPT | Performed by: INTERNAL MEDICINE

## 2022-06-17 PROCEDURE — 85025 COMPLETE CBC W/AUTO DIFF WBC: CPT

## 2022-06-17 PROCEDURE — 97161 PT EVAL LOW COMPLEX 20 MIN: CPT

## 2022-06-17 PROCEDURE — 97165 OT EVAL LOW COMPLEX 30 MIN: CPT

## 2022-06-17 PROCEDURE — 6370000000 HC RX 637 (ALT 250 FOR IP): Performed by: SURGERY

## 2022-06-17 PROCEDURE — 85730 THROMBOPLASTIN TIME PARTIAL: CPT

## 2022-06-17 PROCEDURE — 36415 COLL VENOUS BLD VENIPUNCTURE: CPT

## 2022-06-17 PROCEDURE — 80048 BASIC METABOLIC PNL TOTAL CA: CPT

## 2022-06-17 PROCEDURE — 6360000002 HC RX W HCPCS: Performed by: INTERNAL MEDICINE

## 2022-06-17 RX ADMIN — SODIUM CHLORIDE: 9 INJECTION, SOLUTION INTRAVENOUS at 04:28

## 2022-06-17 RX ADMIN — HEPARIN SODIUM AND DEXTROSE 13.8 UNITS/KG/HR: 10000; 5 INJECTION INTRAVENOUS at 10:50

## 2022-06-17 RX ADMIN — SODIUM CHLORIDE, PRESERVATIVE FREE 10 ML: 5 INJECTION INTRAVENOUS at 21:37

## 2022-06-17 RX ADMIN — HEPARIN SODIUM 10000 UNITS: 1000 INJECTION INTRAVENOUS; SUBCUTANEOUS at 12:01

## 2022-06-17 RX ADMIN — APIXABAN 10 MG: 5 TABLET, FILM COATED ORAL at 16:23

## 2022-06-17 RX ADMIN — SODIUM CHLORIDE, PRESERVATIVE FREE 10 ML: 5 INJECTION INTRAVENOUS at 12:02

## 2022-06-17 ASSESSMENT — PAIN SCALES - GENERAL
PAINLEVEL_OUTOF10: 0
PAINLEVEL_OUTOF10: 0

## 2022-06-17 NOTE — PROGRESS NOTES
Occupational Therapy  Facility/Department: [facility] ICU  Occupational Therapy Initial Assessment    Name: Lisa Bradley  : 1950  MRN: [id number]  Date of Service: 2022    Discharge Recommendations:  Home independently       Patient Diagnosis(es): The primary encounter diagnosis was Acute deep vein thrombosis (DVT) of femoral vein of left lower extremity ([facility] A diagnosis of Acute pulmonary embolism, unspecified pulmonary embolism type, unspecified whether acute cor pulmonale present [facility]) was also pertinent to this visit. Past Medical History:  has a past medical history of Arthritis, Cancer ([facility] [address] [place] (hard of hearing), and [facility] Past Surgical History:  has a past surgical history that includes malignant skin lesion excision (Left, 10/05/2016); joint replacement (Left); Colonoscopy; Prostate biopsy; and Prostatectomy (N/A, 2021). Assessment   Prognosis: Good  Decision Making: Low Complexity  No Skilled OT: Independent with functional mobility; Independent with ADL's;Safe to return home; No OT goals identified  REQUIRES OT FOLLOW-UP: No  Activity Tolerance  Activity Tolerance: Patient Tolerated treatment well          Restrictions  Restrictions/Precautions  Restrictions/Precautions: Up as Tolerated,General Precautions    Subjective   General  Chart Reviewed: Yes  Patient assessed for rehabilitation services?: Yes  Family / Caregiver Present: No     Social/Functional History  Social/Functional History  Lives With: Spouse  Type of Home: House  Home Layout: One level  Home Access: Stairs to enter without rails  Entrance Stairs - Number of Steps: 3  Bathroom Shower/Tub: Tub/Shower unit  Bathroom Toilet: Handicap height  Bathroom Equipment: Grab bars in shower  Has the patient had two or more falls in the past year or any fall with injury in the past year?: No  ADL Assistance: Independent  Homemaking Assistance: Independent  Ambulation Assistance: Independent  Transfer Assistance: Independent  Active : Yes  Occupation: Retired  Type of Occupation: Chiropractor  Leisure & Hobbies: yardwork, 4 sons, paint, bike, swim  Additional Comments: Pt reports he is very physically active       Objective   Heart Rate: 54  BP: 119/72  MAP (Calculated): 87.67  Resp: 13  SpO2: 98 %  Vision Exceptions: Wears glasses for reading  Hearing: Exceptions to [facility]  Hearing Exceptions: Hard of hearing/hearing concerns;Bilateral hearing aid       Observation/Palpation  Posture: Good  Edema: L LE  Safety Devices  Type of Devices: Left in bed;Call light within reach;Nurse notified  Balance  Sitting: Intact  Standing: Intact  Gait  Overall Level of Assistance: Supervision (Pt completed functional mob in room with no device. Pt very indp with assist needed only for line management)     AROM: Within functional limits  PROM: Within functional limits  Strength: Within functional limits  Coordination: Within functional limits  Tone: Normal  Sensation: Intact  ADL  Feeding: Independent  Grooming: Independent  UE Bathing: Independent  LE Bathing: Independent  UE Dressing: Independent  LE Dressing: Independent  Toileting: Independent     Activity Tolerance  Activity Tolerance: Patient tolerated evaluation without incident  Bed mobility  Supine to Sit: Independent  Sit to Supine: Independent  Transfers  Sit to stand: Independent  Stand to sit:  Independent     Cognition  Overall Cognitive Status: WFL        Sensation  Overall Sensation Status: WFL         Education Provided: Role of Therapy;Plan of Care;Energy Conservation                          Therapy Time   Individual Concurrent Group Co-treatment   Time In 1007 (plus 10 min chart review)         Time Out 1025         Minutes [facility]

## 2022-06-17 NOTE — CONSULTS
Reason for Consult: Bradycardia  Requesting Physician: Leo Diggs MD    CHIEF COMPLAINT: Left lower extremity edema    History Obtained From:  patient, electronic medical record    HISTORY OF PRESENT ILLNESS:      The patient is a 81 y.o. male with significant past medical history of melanoma and prostate cancer that has no known prior cardiac history and does not follow with a cardiologist who presents with left leg edema since Tuesday and he had a venous duplex scan as an outpatient and found DVT and was admitted to the hospital.  He was also found to have pulmonary embolism and currently on a heparin drip. The patient denies having any chest pain or shortness of breath. While admitted to the hospital on telemetry he was found to have bradycardia and cardiology was consulted. The patient reports normally he is very active and bikes often without having any chest pain, shortness of breath, palpitations, syncope, or near syncope. He denies having any recent long trips but did drive home from [place] in April. He had bradycardia last night while sleeping and also was noted to have borderline hypoxia during that time and is questioning possible sleep apnea and plans for testing as an outpatient. The patient denied any associated symptoms.     Past Medical History:    Past Medical History:   Diagnosis Date    Arthritis     Cancer ([facility] [address]     melanoma    [place] (hard of hearing)     bilateral    Prostate cancer [facility])      Past Surgical History:    Past Surgical History:   Procedure Laterality Date    COLONOSCOPY      JOINT REPLACEMENT Left     hip    MALIGNANT SKIN LESION EXCISION Left 10/05/2016    left back melanoma- Dr Dos Santos [place] N/A 11/23/2021    RADICAL PROSTATECTOMY WITH PELVIC LYMPH NODE DISSECTION   LAPAROSCOPIC ROBOTIC XI performed by Willimas Villarreal MD at [address] Medications:  Prior to Admission medications    Medication Sig Start Date End Date Taking?  Authorizing Provider   Coenzyme Q10 (CO Q10 PO) Take 1 tablet by mouth daily   Yes Historical Provider, MD   b complex vitamins capsule Take 1 capsule by mouth daily   Yes Historical Provider, MD   Multiple Vitamins-Minerals (THERAPEUTIC MULTIVITAMIN-MINERALS) tablet Take 1 tablet by mouth daily   Yes Historical Provider, MD   Ascorbic Acid (VITAMIN C PO) Take 1 tablet by mouth daily   Yes Historical Provider, MD   TURMERIC PO Take 1 capsule by mouth daily   Yes Historical Provider, MD     Current Medications:    Current Facility-Administered Medications   Medication Dose Route Frequency Provider Last Rate Last Admin    zolpidem (AMBIEN) tablet 5 mg  5 mg Oral Nightly PRN Rebecca Mayorga MD        hydrALAZINE (APRESOLINE) injection 10 mg  10 mg IntraVENous Q6H PRN Rebecca Mayorga MD        docusate sodium (COLACE) capsule 100 mg  100 mg Oral BID PRN Rebecca Mayorga MD        HYDROcodone-acetaminophen (NORCO) 5-325 MG per tablet 1 tablet  1 tablet Oral Q6H PRN Rebecca Mayorga MD        morphine (PF) injection 1 mg  1 mg IntraVENous Q4H PRN Rebecca Mayorga MD        0.9 % sodium chloride infusion   IntraVENous Continuous Rebecca Mayorga MD 75 mL/hr at 06/17/22 0428 New Bag at 06/17/22 0428    sodium chloride flush 0.9 % injection 10 mL  10 mL IntraVENous 2 times per day Rebecca Mayorga MD   10 mL at 06/17/22 1202    sodium chloride flush 0.9 % injection 10 mL  10 mL IntraVENous PRN Rebecca Mayorga MD        0.9 % sodium chloride infusion   IntraVENous PRN Rebecca Mayorga MD        potassium chloride (KLOR-CON M) extended release tablet 40 mEq  40 mEq Oral PRN Rebecca Mayorga MD        Or    potassium bicarb-citric acid (EFFER-K) effervescent tablet 40 mEq  40 mEq Oral PRN Rebecca Mayorga MD        Or    potassium chloride 10 mEq/100 mL IVPB (Peripheral Line)  10 mEq IntraVENous PRN Rebecca Mayorga MD        magnesium sulfate 1000 mg in dextrose 5% 100 mL IVPB  1,000 mg IntraVENous PRN Suzy Brown MD        ondansetron (ZOFRAN-ODT) disintegrating tablet 4 mg  4 mg Oral Q8H PRN Suzy Brown MD        Or    ondansetron [facility]) injection 4 mg  4 mg IntraVENous Q6H PRN Suzy Brown MD        senna (SENOKOT) tablet 8.6 mg  1 tablet Oral BID PRN Suzy Brown MD        acetaminophen (TYLENOL) tablet 650 mg  650 mg Oral Q6H PRN Suzy Brown MD        Or    acetaminophen (TYLENOL) suppository 650 mg  650 mg Rectal Q6H PRN Suzy Brown MD        heparin (porcine) injection 10,000 Units  10,000 Units IntraVENous PRN Flako Carson MD   10,000 Units at 06/17/22 1201    heparin (porcine) injection 5,000 Units  5,000 Units IntraVENous PRN Flako Carson MD        heparin 25,000 units in dextrose 5% 250 mL (premix) infusion  5-30 Units/kg/hr IntraVENous Continuous Flako Carson MD 17.8 mL/hr at 06/17/22 1050 13.8 Units/kg/hr at 06/17/22 1050    apixaban (ELIQUIS) tablet 10 mg  10 mg Oral BID Sapphire Calvillo MD        Followed by   Savanna Patterson ON 6/23/2022] apixaban (ELIQUIS) tablet 5 mg  5 mg Oral BID Sapphire Calvillo MD         Allergies:  Patient has no known allergies.     Social History:    Social History     Socioeconomic History    Marital status:      Spouse name: Not on file    Number of children: Not on file    Years of education: Not on file    Highest education level: Not on file   Occupational History    Not on file   Tobacco Use    Smoking status: Never Smoker    Smokeless tobacco: Never Used   Vaping Use    Vaping Use: Never used   Substance and Sexual Activity    Alcohol use: No    Drug use: No    Sexual activity: Not on file   Other Topics Concern    Not on file   Social History Narrative    Not on file     Social Determinants of Health     Financial Resource Strain:     Difficulty of Paying Living Expenses: Not on file   Food Insecurity:     Worried About [address] in the Last Year: Not on file    [address] in the Last Year: Not on file   Transportation Needs:     Lack of Transportation (Medical): Not on file    Lack of Transportation (Non-Medical):  Not on file   Physical Activity:     Days of Exercise per Week: Not on file    Minutes of Exercise per Session: Not on file   Stress:     Feeling of Stress : Not on file   Social Connections:     Frequency of Communication with Friends and Family: Not on file    Frequency of Social Gatherings with Friends and Family: Not on file    Attends [employer] Services: Not on file    Active Member of [address] [place] [address] [employer] or Organizations: Not on file    Attends Club or Organization Meetings: Not on file    Marital Status: Not on file   Intimate Partner Violence:     Fear of Current or Ex-Partner: Not on file    Emotionally Abused: Not on file    Physically Abused: Not on file    Sexually Abused: Not on file   Housing Stability:     Unable to Pay for Housing in the Last Year: Not on file    Number of [place] in the Last Year: Not on file    Unstable Housing in the Last Year: Not on file     Family History:   Family History   Problem Relation Age of Onset    No Known Problems Mother     Other Father        · REVIEW OF SYSTEMS   CONSTITUTIONAL: negative  EYES:  negative  HEENT:  negative except for  hearing loss  RESPIRATORY: negative   CARDIOVASCULAR:  negative for left lower extremity edema  GASTROINTESTINAL:  negative  GENITOURINARY:  negative  INTEGUMENT:  negative  HEMATOLOGIC/LYMPHATIC:  negative  ALLERGIC/IMMUNOLOGIC:  negative  ENDOCRINE:  negative  MUSCULOSKELETAL:  negative for left lower extremity edema  NEUROLOGICAL:  negative  BEHAVIOR/PSYCH:  negative    PHYSICAL EXAM:    Vitals:    VITALS:  /72   Pulse 54   Temp 97.3 °F (36.3 °C) (Temporal)   Resp 13   Ht 6' 5\" (1.956 m)   Wt 286 lb (129.7 kg)   SpO2 98%   BMI 33.91 kg/m²   24HR INTAKE/OUTPUT:      Intake/Output Summary (Last 24 hours) at 6/17/2022 1452  Last data filed at 6/17/2022 0915  Gross per 24 hour Intake 120 ml   Output 2300 ml   Net -2180 ml       CONSTITUTIONAL:  awake, alert, cooperative, no apparent distress, and appears stated age  EYES: Sclera clear, conjunctiva normal  ENT:  normocepalic, without obvious abnormality  NECK:  supple, symmetrical, trachea midline, no carotid bruit ,   No  JVD  BACK:  symmetric  LUNGS: Non-labored, good air exchange, clear to auscultation bilaterally, no crackles or wheezing  CARDIOVASCULAR:  Regular rate and rhythm, normal S1 and S2, no S3 or S4, and no murmur noted, no rub.  dorsalis pedis, posterior tibial and bilateralpresent 2+  ABDOMEN:  Normal bowel sounds, soft, non-distended, non-tender  MUSCULOSKELETAL:  there is no redness, warmth, or swelling of the joints  Mild left lower extremity edema  NEUROLOGIC:  Awake, alert, oriented to name, place and time. SKIN:  no bruising or bleeding, normal skin color, texture, turgor and no jaundice    DATA:   ECG:    ECHO:   Date: 6/16/2022:  CONCLUSIONS     Summary  Left ventricle is normal in size. Mild left ventricular hypertrophy. Global left ventricular systolic function is normal with an estimated  ejection fraction of 60 % . No obvious wall motion abnormality seen. No significant valvular regurgitation or stenosis seen.   No pericardial effusion is seen.     Signature  ----------------------------------------------------------------------------   Electronically signed by Deneen Roberts(Sonographer) on 06/16/2022 02:38   PM  ----------------------------------------------------------------------------     ----------------------------------------------------------------------------   Electronically signed by Sunita Cali(Interpreting physician) on 06/16/2022   03:25 PM  ----------------------------------------------------------------------------    Telemetry: Sinus rhythm with mild intermittent sinus bradycardia and intermittent second-degree AV block type I, Ana María    Cardiology Labs:  Recent Labs 06/15/22  2102   TROPHS 18     Warfarin PT/INR:  Lab Results   Component Value Date    PROTIME 13.5 06/16/2022    INR 1.0 06/16/2022     CBC:  Lab Results   Component Value Date    WBC 6.4 06/17/2022    RBC 3.89 06/17/2022    HGB 12.1 06/17/2022    HCT 37.0 06/17/2022    MCV 95.1 06/17/2022    MCH 31.1 06/17/2022    MCHC 32.7 06/17/2022    RDW 13.6 06/17/2022     06/17/2022    MPV 9.2 06/17/2022     CMP:  Lab Results   Component Value Date     06/17/2022    K 4.3 06/17/2022     06/17/2022    CO2 24 06/17/2022    BUN 14 06/17/2022    CREATININE 0.86 06/17/2022    GFRAA >60 06/17/2022    LABGLOM >60 06/17/2022    GLUCOSE 120 06/17/2022    CALCIUM 8.7 06/17/2022     Magnesium:  No results found for: MG  PTT:    Lab Results   Component Value Date    APTT 37.5 06/17/2022     TSH:  No results found for: TSH  BNP:   Recent Labs     06/15/22  2102   PROBNP 127     BMP:  Lab Results   Component Value Date     06/17/2022    K 4.3 06/17/2022     06/17/2022    CO2 24 06/17/2022    BUN 14 06/17/2022    CREATININE 0.86 06/17/2022    CALCIUM 8.7 06/17/2022    GFRAA >60 06/17/2022    LABGLOM >60 06/17/2022    GLUCOSE 120 06/17/2022     LIVER PROFILE:No results for input(s): AST, ALT, LABALBU, ALKPHOS, BILITOT, BILIDIR, IBILI, PROT, GLOB, ALBUMIN in the last 72 hours. FLP:  No results found for: CHOL, TRIG, HDL, LDLCHOLESTEROL    IMPRESSION    · Second-degree AV block, type I, Wenckebach intermittent and asymptomatic with stable blood pressure  · DVT/PE on heparin drip and managed by others  · Preserved LV systolic function on echocardiogram done 6/16/2022 with also normal right ventricular size and function  · History of melanoma and prostate cancer, managed by others  · Possible sleep apnea, managed by others    RECOMMENDATIONS:     Continue current cardiac medications. The patient has intermittent second-degree AV block, type I Wenckebach that was asymptomatic through the night.   Avoid AV blocking agents, conservative cardiac management at this time no indication at this time for permanent pacemaker placement. Plans for sleep apnea testing as an outpatient. We will continue to monitor. His DVT/PE are managed by others and patient is currently on a heparin drip with plan to be transition to Eliquis, echocardiogram shows LV systolic function is normal and also normal RV size and function. Management plan was discussed with patient, his wife at bedside, RN, and Dr. Charlie Chew. Thank you for the consultation.     Electronically signed by JEIMY Bledsoe CNP on 6/17/2022 at 2:52 PM     CC: Lo Stallings MD

## 2022-06-17 NOTE — CONSULTS
[facility]      Name: Fawn Hopper  MRN: [id number]     Acct: [de-identified]  Room: [address] [place] [address]    Admit Date: 6/15/2022  PCP: Laura Loomis MD    Physician Requesting Consult:  Dr Caballero Clear    Reason for Consult: Left femoral DVT and PE    Chief Complaint:     Chief Complaint   Patient presents with    Leg Swelling     [facility] [facility] [place] DVT in LLE, sent by Dr Carla Dennison for admission          History Obtained From:     patient    History of Present Illness:      Fawn Hopper is a  81 y.o.  male who presents with Leg Swelling (do[place] done at [facility] showing DVT in LLE, sent by Dr Carla Dennison for admission )      This is a 68-year-old male who presented with left leg pain and swelling. He was noted to have a femoral DVT. CT scan was obtained which also showed a subsegmental pulmonary embolism with no evidence of heart strain. He was admitted for observation. I spent about 30 minutes discussing DVT and PE with the patient and his wife in detail. Treatment is 6 months of systemic anticoagulation with Eliquis and there is no need for follow-up imaging. We discussed the risk of cancer in the patient who is 81years old. He has a history of prostate cancer but has gotten the PET scan and has otherwise been negative for any malignancy. He also had a colonoscopy as recently as a year ago. We spent discussed other topics including whether or not he needs to limit his activity level and he does not need to do so. There is been no evidence that increase activity increases the risk of pulmonary embolism.     Past Medical History:     Past Medical History:   Diagnosis Date    Arthritis     Cancer ([address]     melanoma    [place] (hard of hearing)     bilateral    Prostate cancer [facility])         Past Surgical History:     Past Surgical History:   Procedure Laterality Date    COLONOSCOPY      JOINT REPLACEMENT Left     hip    MALIGNANT SKIN LESION EXCISION Left 10/05/2016    left back melanoma- Dr Moise Tirado N/A 11/23/2021    RADICAL PROSTATECTOMY WITH PELVIC LYMPH NODE DISSECTION   LAPAROSCOPIC ROBOTIC XI performed by Daphne Andres MD at [facility]        Medications Prior to Admission:       Prior to Admission medications    Medication Sig Start Date End Date Taking? Authorizing Provider   Coenzyme Q10 (CO Q10 PO) Take 1 tablet by mouth daily   Yes Historical Provider, MD   b complex vitamins capsule Take 1 capsule by mouth daily   Yes Historical Provider, MD   Multiple Vitamins-Minerals (THERAPEUTIC MULTIVITAMIN-MINERALS) tablet Take 1 tablet by mouth daily   Yes Historical Provider, MD   Ascorbic Acid (VITAMIN C PO) Take 1 tablet by mouth daily   Yes Historical Provider, MD   TURMERIC PO Take 1 capsule by mouth daily   Yes Historical Provider, MD        Allergies:       Patient has no known allergies. Social History:     Tobacco:    reports that he has never smoked. He has never used smokeless tobacco.  Alcohol:      reports no history of alcohol use. Drug Use:  reports no history of drug use.     Family History:     Family History   Problem Relation Age of Onset    No Known Problems Mother     Other Father        Review of Systems:     Positive and Negative as described in HPI    Constitutional:  negative for  fevers, chills, sweats, fatigue, and weight loss  HEENT:  negative for vision or hearing changes,   Respiratory:  negative for shortness of breath, cough, or congestion  Cardiovascular:  negative for  chest pain, palpitations  Gastrointestinal:  negative for nausea, vomiting, diarrhea, constipation, abdominal pain  Genitourinary:  negative for frequency, dysuria  Integument/Breast:  negative for rash, skin lesions  Musculoskeletal:  negative for muscle aches or joint pain  Neurological:  negative for headaches, dizziness, lightheadedness, numbness, pain and tingling extremities  Behavior/Psych:  negative for depression and anxiety    Code Status: Full Code    Physical Exam:     Vitals:  /70   Pulse 69   Temp 97.8 °F (36.6 °C) (Oral)   Resp 14   Ht 6' 5\" (1.956 m)   Wt 284 lb 6.4 oz (129 kg)   SpO2 93%   BMI 33.72 kg/m²   Temp (24hrs), Av.8 °F (36.6 °C), Min:97.5 °F (36.4 °C), Max:98.4 °F (36.9 °C)      General appearance - alert, well appearing and in no acute distress  Mental status - oriented to person, place and time with normal affect  Head - normocephalic and atraumatic  Eyes - pupils equal and reactive, extraocular eye movements intact, conjunctiva clear  Ears - hearing appears to be intact  Nose - no drainage noted  Mouth - mucous membranes moist  Neck - supple, no carotid bruits, thyroid not palpable, no JVD  Chest - clear to auscultation, normal effort  Heart - normal rate, regular rhythm, no murmurs  Abdomen - soft, non-tender, non-distended, bowel sounds present all four quadrants, no masses, hepatomegaly, splenomegaly or aortic enlargement  Neurological - normal speech, no focal findings or movement disorder noted, cranial nerves II through XII grossly intact  Extremities - peripheral pulses palpable, 1+ nonpitting left leg edema.     Skin - no gross lesions, rashes, or induration noted      Data:     Lab Results   Component Value Date    WBC 6.1 2022    HGB 12.8 (L) 2022    HCT 40.0 (L) 2022    MCV 96.2 2022     2022     Lab Results   Component Value Date     2022    K 4.2 2022     2022    CO2 25 2022    BUN 19 2022    CREATININE 0.98 2022    GLUCOSE 106 2022    CALCIUM 9.3 2022      Lab Results   Component Value Date    INR 1.0 2022    INR 1.0 06/15/2022    PROTIME 13.5 2022    PROTIME 13.4 06/15/2022       Assessment:     Primary Problem  DVT (deep vein thrombosis) in pregnancy  3 68-year-old male with unprovoked DVT    Active Hospital Problems    Diagnosis Date Noted    PE (pulmonary thromboembolism) ([facility] [I26.99] 06/16/2022     Priority: Medium    DVT (deep vein thrombosis) in pregnancy [O22.30] 06/15/2022     Priority: Medium       Plan:     1. I recommend 6 months of systemic anticoagulation with Eliquis. The Eliquis dose is 10 mg p.o. twice daily for 7 days and then 5 mg twice a day for 6 months  2. No further vascular follow-up or imaging is needed  3.  Vascular surgery to sign off      Electronically signed by Bee Bryan MD on 6/16/2022 at 8:37 PM     Copy sent to Dr. Lucho Buchanan MD

## 2022-06-17 NOTE — PROGRESS NOTES
Physical Therapy  Facility/Department: [facility]  Physical Therapy Initial Assessment    Name: Susie Sanchez  : 1950  MRN: [id number]  Date of Service: 2022    Discharge Recommendations:  Home independently       PER HPI: Susie Sanchez is a  81 y.o.  male who presents with Leg Swelling (doppler done at [facility] showing DVT in LLE, sent by Dr Farhana Alejandro for admission )      This is a 82-year-old male who presented with left leg pain and swelling. He was noted to have a femoral DVT. CT scan was obtained which also showed a subsegmental pulmonary embolism with no evidence of heart strain. He was admitted for observation.     I spent about 30 minutes discussing DVT and PE with the patient and his wife in detail. Treatment is 6 months of systemic anticoagulation with Eliquis and there is no need for follow-up imaging. We discussed the risk of cancer in the patient who is 81years old. He has a history of prostate cancer but has gotten the PET scan and has otherwise been negative for any malignancy. He also had a colonoscopy as recently as a year ago.     We spent discussed other topics including whether or not he needs to limit his activity level and he does not need to do so. There is been no evidence that increase activity increases the risk of pulmonary embolism. Patient Diagnosis(es): The primary encounter diagnosis was Acute deep vein thrombosis (DVT) of femoral vein of left lower extremity ([address] A diagnosis of Acute pulmonary embolism, unspecified pulmonary embolism type, unspecified whether acute cor pulmonale present [facility]) was also pertinent to this visit. Past Medical History:  has a past medical history of Arthritis, Cancer ([address] [place] (hard of hearing), and Prostate cancer ([address] Past Surgical History:  has a past surgical history that includes malignant skin lesion excision (Left, 10/05/2016); joint replacement (Left);  Colonoscopy; Prostate biopsy; and Prostatectomy (N/A, 11/23/2021). Assessment   Assessment: Patient independent with functional mobility no acute therapy needed.   Therapy Prognosis: Good  Decision Making: Low Complexity  No Skilled PT: Independent with functional mobility   Requires PT Follow-Up: No  Activity Tolerance  Activity Tolerance: Patient tolerated evaluation without incident     Plan   Plan  Plan: Discharge with evaluation only  Safety Devices  Type of Devices: Left in bed     Restrictions  Restrictions/Precautions  Restrictions/Precautions: Up as Tolerated,General Precautions     Subjective   General  Chart Reviewed: Yes  Patient assessed for rehabilitation services?: Yes  Family / Caregiver Present: No  Follows Commands: Within Functional Limits  General Comment  Comments: RN states patient appropriate for therapy  Subjective  Subjective: patient pleasant and motivated         Social/Functional History  Social/Functional History  Lives With: Spouse  Type of Home: House  Home Layout: One level  Home Access: Stairs to enter without rails  Entrance Stairs - Number of Steps: 3  Bathroom Shower/Tub: Tub/Shower unit  Bathroom Toilet: Handicap height  Bathroom Equipment: Grab bars in shower  Has the patient had two or more falls in the past year or any fall with injury in the past year?: No  ADL Assistance: Independent  Homemaking Assistance: Independent  Ambulation Assistance: Independent  Transfer Assistance: Independent  Active : Yes  Occupation: Retired  Type of Occupation: Chiropractor  Leisure & Hobbies: yardwork, 4 sons, paint, bike, swim  Vision/Hearing  Vision  Vision: Impaired  Vision Exceptions: Wears glasses for reading  Hearing  Hearing: Exceptions to [facility]  Hearing Exceptions: Hard of hearing/hearing concerns;Bilateral hearing aid    Cognition   Orientation  Overall Orientation Status: Within Functional Limits  Cognition  Overall Cognitive Status: WFL     Objective   Heart Rate: 54  BP: 119/72  MAP (Calculated): 87.67  Resp: 13  SpO2: 98 %  O2 Device: Nasal cannula     Observation/Palpation  Posture: Good  Edema: L LE        AROM RLE (degrees)  RLE AROM: WFL  AROM LLE (degrees)  LLE AROM : WFL  LLE General AROM: feels very tight to patient due to edema  AROM RUE (degrees)  RUE General AROM: refer to OT assessment  AROM LUE (degrees)  LUE General AROM: refer to OT assessment  Strength RLE  Strength RLE: WFL  Strength LLE  Strength LLE: WFL  Strength RUE  Comment: refer to OT assessment  Strength LUE  Comment: refer to OT assessment     Sensation  Overall Sensation Status: WFL     Bed mobility  Supine to Sit: Independent  Sit to Supine: Independent  Transfers  Sit to Stand: Independent  Stand to sit:  Independent  Ambulation  Surface: level tile  Device: No Device  Assistance: Independent  Quality of Gait: patient states L LE tight due to edema but he is steady and demonstrates good balance  Distance: 50 feet     Balance  Sitting - Static: Good  Sitting - Dynamic: Good  Standing - Static: Good  Standing - Dynamic: Good             AM-PAC Score  AM-PAC Inpatient Mobility Raw Score : 24 (06/17/22 1244)  AM-PAC Inpatient T-Scale Score : 61.14 (06/17/22 1244)  Mobility Inpatient CMS 0-100% Score: 0 (06/17/22 1244)  Mobility Inpatient CMS G-Code Modifier : CH (06/17/22 1244)          Goals          Education    Role of Therapy; Plan of Care      Therapy Time   Individual Concurrent Group Co-treatment   Time In 1007         Time Out 1025 (additional 10 minutes for chart review)         Minutes 18+10=28                 Ne Marie, PT

## 2022-06-17 NOTE — PLAN OF CARE
Problem: Discharge Planning  Goal: Discharge to home or other facility with appropriate resources  Outcome: Progressing  Flowsheets  Taken 6/17/2022 1618 by Eva Thacker RN  Discharge to home or other facility with appropriate resources:   Identify barriers to discharge with patient and caregiver   Identify discharge learning needs (meds, wound care, etc)   Arrange for needed discharge resources and transportation as appropriate  Taken 6/17/2022 0900 by Stefan Jasso RN  Discharge to home or other facility with appropriate resources: Identify barriers to discharge with patient and caregiver     Problem: Pain  Goal: Verbalizes/displays adequate comfort level or baseline comfort level  Outcome: Progressing  Flowsheets (Taken 6/17/2022 0900)  Verbalizes/displays adequate comfort level or baseline comfort level: Encourage patient to monitor pain and request assistance     Problem: Safety - Adult  Goal: Free from fall injury  Outcome: Progressing  Flowsheets (Taken 6/17/2022 1230)  Free From Fall Injury: Instruct family/caregiver on patient safety     Problem: Skin/Tissue Integrity  Goal: Absence of new skin breakdown  Description: 1. Monitor for areas of redness and/or skin breakdown  2. Assess vascular access sites hourly  3. Every 4-6 hours minimum:  Change oxygen saturation probe site  4. Every 4-6 hours:  If on nasal continuous positive airway pressure, respiratory therapy assess nares and determine need for appliance change or resting period.   Outcome: Progressing

## 2022-06-17 NOTE — PROGRESS NOTES
Progress note  [facility].,    Adult Hospitalist      Name: Yesica Ayers  MRN: [id number]     Acct: [de-identified]  Room: [address] [place] [address]    Admit Date: 6/15/2022  8:00 PM  PCP: Deja Casey MD    Primary Problem  Principal Problem:    PE (pulmonary thromboembolism) ([facility]  Active Problems:    DVT (deep venous thrombosis) ([facility]  Resolved Problems:    * No resolved hospital problems.  *        Assesment:   Acute hypoxemic respiratory failure  Acute pulmonary embolism, extensive  Lower extremity DVT  History of prostate cancer      Plan:   Patient is admitted to medical ICU  Telemetry  Oxygen to keep SPO2 more than 90%   Check vital signs closely  CBC BMP daily  Echocardiogram showed ejection fraction 60%, no signs of right heart strain  IV heparin switched to oral Eliquis  Vascular surgery following  Consulted pulmonology  Patient further noticed to be bradycardic, consulted cardiology commended patient has second-degree AV block type I, recommended conservative cardiology management  Continue other medication as below    Scheduled Meds:   apixaban  10 mg Oral BID    Followed by   Cyrus Day ON 6/23/2022] apixaban  5 mg Oral BID    sodium chloride flush  10 mL IntraVENous 2 times per day     Continuous Infusions:   sodium chloride 75 mL/hr at 06/17/22 0428    sodium chloride       PRN Meds:  zolpidem, 5 mg, Nightly PRN  hydrALAZINE, 10 mg, Q6H PRN  docusate sodium, 100 mg, BID PRN  HYDROcodone-acetaminophen, 1 tablet, Q6H PRN  morphine, 1 mg, Q4H PRN  sodium chloride flush, 10 mL, PRN  sodium chloride, , PRN  potassium chloride, 40 mEq, PRN   Or  potassium alternative oral replacement, 40 mEq, PRN   Or  potassium chloride, 10 mEq, PRN  magnesium sulfate, 1,000 mg, PRN  ondansetron, 4 mg, Q8H PRN   Or  ondansetron, 4 mg, Q6H PRN  senna, 1 tablet, BID PRN  acetaminophen, 650 mg, Q6H PRN   Or  acetaminophen, 650 mg, Q6H PRN        Chief Complaint:     Chief Complaint   Patient presents with    Leg Swelling doppler done at [facility] showing DVT in LLE, sent by Dr Lance Williamson for admission          History of Present Illness:      Josefina Zuniga is a 81 y.o.  male who presents with Leg Swelling (doppler done at [facility] showing DVT in LLE, sent by Dr Lance Williamson for admission )  Patient seen and examined at bedside and reviewed  last 24 hrs events with nursing staff  No acute events overnight. Patient denies any acute complaints. Afebrile  Patient denies any chest pain, shortness of Breath, palpitation, headache, dizziness, cough, nausea, vomiting, abdominal pain, pain, changes in urination or bowel habit or rash. HPI  This is 62-year-old gentleman has been admitted by emergency room, patient came to the ER with a complaint of having left leg swelling, patient has past medical history significant for prostate cancer, patient noticed that his leg is more swollen for the past few days, further patient was sent by primary care physician for a DVT study which was performed at [facility], noticed to have extensive DVT going up to the mid femoral vein, came to the emergency room for this reason, patient denies any prior history of DVT or PE, denies any history of recent long travel, patient further underwent CTA chest in the emergency room showed acute pulmonary embolism in the right main pulmonary artery extending into the segmental right lower lobe and possibly right middle lobe pulmonary arteries with some nonspecific groundglass opacities, patient is admitted for further management    I have personally reviewed the past medical history, past surgical history, medications, social history, and family history, and summarized in the note. Review of Systems:     All 10 point system is reviewed and negative otherwise mentioned in HPI.       Past Medical History:     Past Medical History:   Diagnosis Date    Arthritis     Cancer ([address]     melanoma    [place] (hard of hearing)     bilateral    Prostate cancer ([address] Past Surgical History:     Past Surgical History:   Procedure Laterality Date    COLONOSCOPY      JOINT REPLACEMENT Left     hip    MALIGNANT SKIN LESION EXCISION Left 10/05/2016    left back melanoma- Dr Deutsch [place] N/A 2021    RADICAL PROSTATECTOMY WITH PELVIC LYMPH NODE DISSECTION   LAPAROSCOPIC ROBOTIC XI performed by Sabino Corcoran MD at [facility]        Medications Prior to Admission:       Prior to Admission medications    Medication Sig Start Date End Date Taking? Authorizing Provider   Coenzyme Q10 (CO Q10 PO) Take 1 tablet by mouth daily   Yes Historical Provider, MD   b complex vitamins capsule Take 1 capsule by mouth daily   Yes Historical Provider, MD   Multiple Vitamins-Minerals (THERAPEUTIC MULTIVITAMIN-MINERALS) tablet Take 1 tablet by mouth daily   Yes Historical Provider, MD   Ascorbic Acid (VITAMIN C PO) Take 1 tablet by mouth daily   Yes Historical Provider, MD   TURMERIC PO Take 1 capsule by mouth daily   Yes Historical Provider, MD        Allergies:       Patient has no known allergies. Social History:     Tobacco:    reports that he has never smoked. He has never used smokeless tobacco.  Alcohol:      reports no history of alcohol use. Drug Use:  reports no history of drug use.     Family History:     Family History   Problem Relation Age of Onset    No Known Problems Mother     Other Father          Physical Exam:     Vitals:  /72   Pulse 54   Temp 97.3 °F (36.3 °C) (Temporal)   Resp 13   Ht 6' 5\" (1.956 m)   Wt 286 lb (129.7 kg)   SpO2 98%   BMI 33.91 kg/m²   Temp (24hrs), Av.4 °F (36.3 °C), Min:97 °F (36.1 °C), Max:97.8 °F (36.6 °C)          General appearance - alert, well appearing, and in no acute distress  Mental status - oriented to person, place, and time with normal affect  Head - normocephalic and atraumatic  Eyes - pupils equal and reactive, extraocular eye movements intact, conjunctiva clear  Ears - hearing appears to be intact  Nose - no drainage noted  Mouth - mucous membranes moist  Neck - supple, no carotid bruits, thyroid not palpable  Chest - clear to auscultation, normal effort  Heart - normal rate, regular rhythm, no murmur  Abdomen - soft, nontender, nondistended, bowel sounds present all four quadrants, no masses, hepatomegaly or splenomegaly  Neurological - normal speech, no focal findings or movement disorder noted, cranial nerves II through XII grossly intact  Extremities - peripheral pulses palpable, no pedal edema or calf pain with palpation  Skin - no gross lesions, rashes, or induration noted        Data:     Labs:    Hematology:  Recent Labs     06/15/22  2102 06/15/22  2102 06/16/22  0326 06/16/22  1917 06/17/22  0217   WBC 7.0   < > 6.7 6.1 6.4   RBC 4.46   < > 3.95* 4.16* 3.89*   HGB 13.8   < > 12.3* 12.8* 12.1*   HCT 42.9   < > 38.1* 40.0* 37.0*   MCV 96.2   < > 96.5 96.2 95.1   MCH 30.9   < > 31.1 30.8 31.1   MCHC 32.2   < > 32.3 32.0 32.7   RDW 13.6   < > 13.6 13.7 13.6      < > 164 165 146   MPV 9.6   < > 9.8 9.0 9.2   INR 1.0  --   --  1.0  --     < > = values in this interval not displayed. Chemistry:  Recent Labs     06/15/22  2102 06/16/22  0326 06/17/22  0217    139 138   K 4.2 4.2 4.3    107 107   CO2 25 25 24   GLUCOSE 156* 106* 120*   BUN 19 19 14   CREATININE 0.93 0.98 0.86   ANIONGAP 9 7* 7*   LABGLOM >60 >60 >60   GFRAA >60 >60 >60   CALCIUM 9.7 9.3 8.7   PROBNP 127  --   --    TROPHS 18  --   --      No results for input(s): PROT, LABALBU, LABA1C, I5GLPDZ, I4XJINX, FT4, TSH, AST, ALT, LDH, GGT, ALKPHOS, LABGGT, BILITOT, BILIDIR, AMMONIA, AMYLASE, LIPASE, LACTATE, CHOL, HDL, LDLCHOLESTEROL, CHOLHDLRATIO, TRIG, VLDL, JUM00ML, PHENYTOIN, PHENYF, URICACID, POCGLU in the last 72 hours.     Lab Results   Component Value Date    INR 1.0 06/16/2022    INR 1.0 06/15/2022    PROTIME 13.5 06/16/2022    PROTIME 13.4 06/15/2022       Lab Results   Component Value Date/Time SPECIAL NOT REPORTED 11/16/2021 02:27 PM     Lab Results   Component Value Date/Time    CULTURE NO SIGNIFICANT GROWTH 11/16/2021 02:27 PM       No results found for: POCPH, PHART, PH, POCPCO2, CXU1PRA, PCO2, POCPO2, PO2ART, PO2, POCHCO3, NNX7EUV, HCO3, NBEA, PBEA, BEART, BE, THGBART, THB, KHX2ZGG, NIHJ7FSQ, X4DWXEXW, O2SAT, FIO2    Radiology:    CT CHEST PULMONARY EMBOLISM W CONTRAST    Result Date: 6/16/2022  1. Significantly motion limited exam.  There is acute pulmonary embolus in the right main pulmonary artery extending into the segmental right lower lobe and possibly a right middle lobe pulmonary arteries. No definite left-sided pulmonary embolus. 2. Findings suggestive of right heart strain. 3. Nonspecific bibasilar curvilinear and ground-glass opacities which may be related to at atelectasis but can also be seen in the setting of recent viral infection. Critical results were called by Dr. Saira Lopez MD to Dr. Era Varma on 6/16/2022 at 00:26. All radiological studies reviewed                Code Status:  Full Code    Electronically signed by Loreto Vilchis MD on 6/17/2022 at 5:24 PM     Copy sent to Dr. Alia Ahn MD    This note was created with the assistance of a speech-recognition program.  Although the intention is to generate a document that actually reflects the content of the visit, no guarantees can be provided that every mistake has been identified and corrected by editing. Note was updated later by me after  physical examination and  completion of the assessment.

## 2022-06-17 NOTE — CONSULTS
Pulmonary Medicine and [address] Deja Correa MD      Patient - Tucker Mann   MRN -  [id number]   Acct # - [de-identified]   - 1950      Date of Admission -  6/15/2022  8:00 PM  Date of evaluation -  2022  Room - [address] [place] [address]   Everton Potter MD Primary Care Physician - Rita Genao MD     Reason for Consult    Pulmonary embolism     Assessment   · Acute hypoxic respiratory insufficiency   · Acute pulmonary embolism, right main pulmonary artery extending into the segmental right lower and middle lobe pulmonary arteries  · DVT, left lower extremity   · History of prostate cancer status post prostatectomy  · Suspected obstructive sleep apnea/obesity     Recommendations   · Okay to discontinue heparin drip and transition to Eliquis 10 mg twice daily x7 days followed by 5 mg twice daily to complete 6 months course  · Monitor off oxygen  · Incentive spirometry every hour while awake  · Cardiology on consult   · Recommend outpatient sleep evaluation  · DVT prophylaxis, currently on heparin drip  · Discharge planning after cardiology evaluation  · Detailed discussion had with patient and his wife. · Will follow with you    Problem List      Patient Active Problem List   Diagnosis    Prostate cancer ([address]    DVT (deep venous thrombosis) ([address]    PE (pulmonary thromboembolism) ([address]       SHAI Mann is 81 y.o.  male, who presented to the emergency department yesterday for left leg swelling and pain, onset 22 a DVT study ordered by his PCP at [address] [place] [address] showed an extensive DVT extending to the mid femoral vein. CT scan on arrival shows an acute pulmonary embolus in the right main pulmonary artery extending into the segmental right lower and middle lobe. He was placed on a heparin drip and admitted to the ICU.  He was placed on oxygen for a short time overnight after he became hypoxic during sleep with periods of apnea observed by the nursing staff. Currently he is resting comfortably in bed on room air in no acute distress. He has periods of irregular heart rhythms. PMHx   Past Medical History      Diagnosis Date    Arthritis     Cancer ([facility]     melanoma    [place] (hard of hearing)     bilateral    Prostate cancer ([facility]       Past Surgical History        Procedure Laterality Date    COLONOSCOPY      JOINT REPLACEMENT Left     hip    MALIGNANT SKIN LESION EXCISION Left 10/05/2016    left back melanoma- Dr Leila Rees N/A 11/23/2021    RADICAL PROSTATECTOMY WITH PELVIC LYMPH NODE DISSECTION   LAPAROSCOPIC ROBOTIC XI performed by Concepcion Fitzgerald MD at [facility]    Current Medications    sodium chloride flush  10 mL IntraVENous 2 times per day    apixaban  10 mg Oral BID    Followed by   Yuma Fossa ON 6/23/2022] apixaban  5 mg Oral BID     zolpidem, hydrALAZINE, docusate sodium, HYDROcodone-acetaminophen, morphine, sodium chloride flush, sodium chloride, potassium chloride **OR** potassium alternative oral replacement **OR** potassium chloride, magnesium sulfate, ondansetron **OR** ondansetron, senna, acetaminophen **OR** acetaminophen, heparin (porcine), heparin (porcine)  IV Drips/Infusions   sodium chloride 75 mL/hr at 06/17/22 0428    sodium chloride      heparin (PORCINE) Infusion 13.8 Units/kg/hr (06/17/22 1050)     Home Medications  Medications Prior to Admission: Coenzyme Q10 (CO Q10 PO), Take 1 tablet by mouth daily  b complex vitamins capsule, Take 1 capsule by mouth daily  Multiple Vitamins-Minerals (THERAPEUTIC MULTIVITAMIN-MINERALS) tablet, Take 1 tablet by mouth daily  Ascorbic Acid (VITAMIN C PO), Take 1 tablet by mouth daily  TURMERIC PO, Take 1 capsule by mouth daily    Allergies    Patient has no known allergies.   Social History     Social History     Tobacco Use    Smoking status: Never Smoker    Smokeless tobacco: Never Used   Substance Use Topics    Alcohol use: No     Family History          Problem Relation Age of Onset    No Known Problems Mother     Other Father      ROS - 6 systems   General Denies any fever or chills  HEENT Denies any diplopia, tinnitus or vertigo  Resp Denies any shortness of breath, cough or wheezing  Cardiac Denies any chest pain, palpitations, endorses left lower extremity pain and swelling   GI Denies any melena, hematochezia, hematemesis or pyrosis   Denies any frequency, urgency, hesitancy or incontinence  Heme Denies bruising or bleeding easily  Endocrine Denies any history of diabetes or thyroid disease  Neuro Denies any focal motor or sensory deficits  Psychiatric Denies anxiety, depression, suicidal ideation  Skin Denies rashes, itching, open sores    Vitals     height is 6' 5\" (1.956 m) and weight is 286 lb (129.7 kg). His temporal temperature is 97.3 °F (36.3 °C). His blood pressure is 119/72 and his pulse is 54. His respiration is 13 and oxygen saturation is 98%. Body mass index is 33.91 kg/m². I/O        Intake/Output Summary (Last 24 hours) at 6/17/2022 1138  Last data filed at 6/17/2022 0915  Gross per 24 hour   Intake 120 ml   Output 2300 ml   Net -2180 ml     I/O last 3 completed shifts:  In: -   Out: 2800 [Urine:2800]   Patient Vitals for the past 96 hrs (Last 3 readings):   Weight   06/17/22 0400 286 lb (129.7 kg)   06/16/22 0000 284 lb 6.4 oz (129 kg)   06/15/22 1953 270 lb (122.5 kg)     Exam   General Appearance - Awake, alert, oriented, in no acute distress  HEENT - Head is normocephalic, atraumatic. Pupil reactive to light  Neck - Supple,  trachea midline and straight  Lungs - Good air movement, no crackles or wheezing   Cardiovascular - Heart sounds are normal.  Irregular rhythm normal rate without murmur, gallop or rub. Abdomen - Soft, nontender, nondistended, no masses or organomegaly  Neurologic - CN II-XII are grossly intact.  There are no focal motor or sensory deficits  Skin - No bruising or bleeding  Extremities - Left lower extremity edema     Labs  - Old records and notes have been reviewed in [facility]   CBC     Lab Results   Component Value Date    WBC 6.4 06/17/2022    RBC 3.89 06/17/2022    HGB 12.1 06/17/2022    HCT 37.0 06/17/2022     06/17/2022    MCV 95.1 06/17/2022    MCH 31.1 06/17/2022    MCHC 32.7 06/17/2022    RDW 13.6 06/17/2022    LYMPHOPCT 33 06/17/2022    MONOPCT 10 06/17/2022    BASOPCT 1 06/17/2022    MONOSABS 0.66 06/17/2022    LYMPHSABS 2.09 06/17/2022    EOSABS 0.37 06/17/2022    BASOSABS 0.05 06/17/2022    DIFFTYPE NOT REPORTED 11/16/2021     BMP   Lab Results   Component Value Date     06/17/2022    K 4.3 06/17/2022     06/17/2022    CO2 24 06/17/2022    BUN 14 06/17/2022    CREATININE 0.86 06/17/2022    GLUCOSE 120 06/17/2022    CALCIUM 8.7 06/17/2022     PTT  Lab Results   Component Value Date    APTT 37.5 (H) 06/17/2022     INR   Lab Results   Component Value Date    INR 1.0 06/16/2022    INR 1.0 06/15/2022    PROTIME 13.5 06/16/2022    PROTIME 13.4 06/15/2022       Radiology    CT Scans    (See actual reports for details)    \"Thank you for asking us to see this patient\"    Case discussed with nurse and patient/family. Questions and concerns addressed.     Electronically signed by     Saida Mc MD on 6/17/2022 at 3:46 PM  Pulmonary Critical Care and Sleep Medicine,  [facility]  Cell: [phone]  Office: [phone]

## 2022-06-18 VITALS
RESPIRATION RATE: 19 BRPM | TEMPERATURE: 97 F | BODY MASS INDEX: 33.93 KG/M2 | HEIGHT: 77 IN | DIASTOLIC BLOOD PRESSURE: 74 MMHG | OXYGEN SATURATION: 95 % | HEART RATE: 58 BPM | WEIGHT: 287.38 LBS | SYSTOLIC BLOOD PRESSURE: 105 MMHG

## 2022-06-18 LAB
ABSOLUTE EOS #: 0.27 K/UL (ref 0–0.44)
ABSOLUTE IMMATURE GRANULOCYTE: 0.01 K/UL (ref 0–0.3)
ABSOLUTE LYMPH #: 1.78 K/UL (ref 1.1–3.7)
ABSOLUTE MONO #: 0.7 K/UL (ref 0.1–1.2)
ANION GAP SERPL CALCULATED.3IONS-SCNC: 8 MMOL/L (ref 9–17)
BASOPHILS # BLD: 1 % (ref 0–2)
BASOPHILS ABSOLUTE: 0.04 K/UL (ref 0–0.2)
BUN BLDV-MCNC: 18 MG/DL (ref 8–23)
BUN/CREAT BLD: 18 (ref 9–20)
CALCIUM SERPL-MCNC: 9 MG/DL (ref 8.6–10.4)
CHLORIDE BLD-SCNC: 108 MMOL/L (ref 98–107)
CO2: 24 MMOL/L (ref 20–31)
CREAT SERPL-MCNC: 1.01 MG/DL (ref 0.7–1.2)
EOSINOPHILS RELATIVE PERCENT: 4 % (ref 1–4)
GFR AFRICAN AMERICAN: >60 ML/MIN
GFR NON-AFRICAN AMERICAN: >60 ML/MIN
GFR SERPL CREATININE-BSD FRML MDRD: ABNORMAL ML/MIN/{1.73_M2}
GLUCOSE BLD-MCNC: 113 MG/DL (ref 70–99)
HCT VFR BLD CALC: 37.9 % (ref 40.7–50.3)
HEMOGLOBIN: 12.3 G/DL (ref 13–17)
IMMATURE GRANULOCYTES: 0 %
LYMPHOCYTES # BLD: 26 % (ref 24–43)
MCH RBC QN AUTO: 30.9 PG (ref 25.2–33.5)
MCHC RBC AUTO-ENTMCNC: 32.5 G/DL (ref 28.4–34.8)
MCV RBC AUTO: 95.2 FL (ref 82.6–102.9)
MONOCYTES # BLD: 10 % (ref 3–12)
NRBC AUTOMATED: 0 PER 100 WBC
PDW BLD-RTO: 13.4 % (ref 11.8–14.4)
PLATELET # BLD: 166 K/UL (ref 138–453)
PMV BLD AUTO: 8.9 FL (ref 8.1–13.5)
POTASSIUM SERPL-SCNC: 4.4 MMOL/L (ref 3.7–5.3)
RBC # BLD: 3.98 M/UL (ref 4.21–5.77)
SEG NEUTROPHILS: 59 % (ref 36–65)
SEGMENTED NEUTROPHILS ABSOLUTE COUNT: 4.07 K/UL (ref 1.5–8.1)
SODIUM BLD-SCNC: 140 MMOL/L (ref 135–144)
WBC # BLD: 6.9 K/UL (ref 3.5–11.3)

## 2022-06-18 PROCEDURE — 6370000000 HC RX 637 (ALT 250 FOR IP): Performed by: SURGERY

## 2022-06-18 PROCEDURE — 36415 COLL VENOUS BLD VENIPUNCTURE: CPT

## 2022-06-18 PROCEDURE — 85025 COMPLETE CBC W/AUTO DIFF WBC: CPT

## 2022-06-18 PROCEDURE — 6370000000 HC RX 637 (ALT 250 FOR IP): Performed by: HOSPITALIST

## 2022-06-18 PROCEDURE — 2580000003 HC RX 258: Performed by: HOSPITALIST

## 2022-06-18 PROCEDURE — 80048 BASIC METABOLIC PNL TOTAL CA: CPT

## 2022-06-18 PROCEDURE — 94761 N-INVAS EAR/PLS OXIMETRY MLT: CPT

## 2022-06-18 RX ADMIN — APIXABAN 10 MG: 5 TABLET, FILM COATED ORAL at 09:30

## 2022-06-18 RX ADMIN — HYDROCODONE BITARTRATE AND ACETAMINOPHEN 1 TABLET: 5; 325 TABLET ORAL at 07:20

## 2022-06-18 RX ADMIN — SODIUM CHLORIDE, PRESERVATIVE FREE 10 ML: 5 INJECTION INTRAVENOUS at 09:30

## 2022-06-18 ASSESSMENT — PAIN DESCRIPTION - DESCRIPTORS: DESCRIPTORS: DISCOMFORT

## 2022-06-18 ASSESSMENT — PAIN DESCRIPTION - ORIENTATION: ORIENTATION: LEFT

## 2022-06-18 ASSESSMENT — PAIN DESCRIPTION - LOCATION: LOCATION: LEG

## 2022-06-18 ASSESSMENT — PAIN SCALES - GENERAL: PAINLEVEL_OUTOF10: 6

## 2022-06-18 NOTE — DISCHARGE SUMMARY
4 [facility],    Adult Hospitalist      Patient ID: Jaycee Black  MRN: [id number]     Acct:  [de-identified]       Patient's PCP: Cecy Noel MD    Admit Date: 6/15/2022     Discharge Date:   6/18/22    Admitting Physician: Charles Chin MD    Discharge Physician: Ariana Quintero MD     CONSULTANTS: Patient Care Team:  Cecy Noel MD as PCP - General (Family Medicine)      Active Discharge Diagnoses:  Acute hypoxemic respiratory failure  Acute pulmonary embolism, extensive  Lower extremity DVT  History of prostate cancer    Hospital Course:    This is 61-year-old gentleman has been admitted by emergency room, patient came to the ER with a complaint of having left leg swelling, patient has past medical history significant for prostate cancer, patient noticed that his leg is more swollen for the past few days, further patient was sent by primary care physician for a DVT study which was performed at [facility], noticed to have extensive DVT going up to the mid femoral vein, came to the emergency room for this reason, patient denies any prior history of DVT or PE, denies any history of recent long travel, patient further underwent CTA chest in the emergency room showed acute pulmonary embolism in the right main pulmonary artery extending into the segmental right lower lobe and possibly right middle lobe pulmonary arteries with some nonspecific groundglass opacities, patient is admitted for further management, patient was admitted with acute hypoxemic respiratory failure secondary to acute pulmonary embolism which was extensive in nature, further patient also noticed to have DVT of lower extremity ultrasound, patient was treated with IV heparin, evaluated by pulmonology and vascular surgery, was further transition to Eliquis patient shortness of breath remains on minimal, patient also noticed to be bradycardic evaluated by cardiology as recommended patient has second-degree AV block type I recommended conservative management, also underwent echocardiogram showed ejection fraction 60% no signs of right heart strain, overall patient is feeling better now patient is discharged from the hospital in stable condition. The plan was discussed in detail with patient who agreed with the plan and verbalized understanding . The patient was seen and examined on day of discharge and this discharge summary is in conjunction with any daily progress note from day of discharge. Hospital Data:    Labs:    Hematology:  Recent Labs     06/15/22  2102 06/16/22  0326 06/16/22  1917 06/17/22  0217 06/18/22  0521   WBC 7.0   < > 6.1 6.4 6.9   RBC 4.46   < > 4.16* 3.89* 3.98*   HGB 13.8   < > 12.8* 12.1* 12.3*   HCT 42.9   < > 40.0* 37.0* 37.9*   MCV 96.2   < > 96.2 95.1 95.2   MCH 30.9   < > 30.8 31.1 30.9   MCHC 32.2   < > 32.0 32.7 32.5   RDW 13.6   < > 13.7 13.6 13.4      < > 165 146 166   MPV 9.6   < > 9.0 9.2 8.9   INR 1.0  --  1.0  --   --     < > = values in this interval not displayed. Chemistry:  Recent Labs     06/15/22  2102 06/15/22  2102 06/16/22  0326 06/17/22  0217 06/18/22  0521      < > 139 138 140   K 4.2   < > 4.2 4.3 4.4      < > 107 107 108*   CO2 25   < > 25 24 24   GLUCOSE 156*   < > 106* 120* 113*   BUN 19   < > 19 14 18   CREATININE 0.93   < > 0.98 0.86 1.01   ANIONGAP 9   < > 7* 7* 8*   LABGLOM >60   < > >60 >60 >60   GFRAA >60   < > >60 >60 >60   CALCIUM 9.7   < > 9.3 8.7 9.0   PROBNP 127  --   --   --   --    TROPHS 18  --   --   --   --     < > = values in this interval not displayed. No results for input(s): PROT, LABALBU, LABA1C, K2UKAQZ, Q7APWIU, FT4, TSH, AST, ALT, LDH, GGT, ALKPHOS, LABGGT, BILITOT, BILIDIR, AMMONIA, AMYLASE, LIPASE, LACTATE, CHOL, HDL, LDLCHOLESTEROL, CHOLHDLRATIO, TRIG, VLDL, HAG37XW, PHENYTOIN, PHENYF, URICACID, POCGLU in the last 72 hours.   Lab Results   Component Value Date    INR 1.0 06/16/2022    INR 1.0 06/15/2022 PROTIME 13.5 2022    PROTIME 13.4 06/15/2022     Lab Results   Component Value Date/Time    SPECIAL NOT REPORTED 2021 02:27 PM     Lab Results   Component Value Date/Time    CULTURE NO SIGNIFICANT GROWTH 2021 02:27 PM       No results found for: POCPH, PHART, PH, POCPCO2, ASK6MMJ, PCO2, POCPO2, PO2ART, PO2, POCHCO3, UAC7VVV, HCO3, NBEA, PBEA, BEART, BE, THGBART, THB, NVW6HOD, HIBH8XRT, U5VTATUB, O2SAT, FIO2    Radiology:    CT CHEST PULMONARY EMBOLISM W CONTRAST    Result Date: 2022  1. Significantly motion limited exam.  There is acute pulmonary embolus in the right main pulmonary artery extending into the segmental right lower lobe and possibly a right middle lobe pulmonary arteries. No definite left-sided pulmonary embolus. 2. Findings suggestive of right heart strain. 3. Nonspecific bibasilar curvilinear and ground-glass opacities which may be related to at atelectasis but can also be seen in the setting of recent viral infection. Critical results were called by Dr. Yusra German MD to Dr. Laura Byrd on 2022 at 00:26.          All radiological studies reviewed      Reviews of Symptoms:    A 10 point system is reviewed and  negative except described in hospital course    Physical Exam:    Vitals:  /70   Pulse 68   Temp 97 °F (36.1 °C) (Oral)   Resp 17   Ht 6' 5\" (1.956 m)   Wt 287 lb 6 oz (130.4 kg)   SpO2 92%   BMI 34.08 kg/m²   Temp (24hrs), Av °F (36.7 °C), Min:97 °F (36.1 °C), Max:98.4 °F (36.9 °C)      General appearance - alert, well appearing, and in no acute distress  Mental status - oriented to person, place, and time with normal affect  Head - normocephalic and atraumatic  Eyes - pupils equal and reactive, extraocular eye movements intact, conjunctiva clear  Ears - hearing appears to be intact  Nose - no drainage noted  Mouth - mucous membranes moist  Neck - supple, no carotid bruits, thyroid not palpable  Chest - clear to auscultation, normal effort  Heart - normal rate, regular rhythm, no murmur  Abdomen - soft, nontender, nondistended, bowel sounds present all four quadrants, no masses, hepatomegaly or splenomegaly  Neurological - normal speech, no focal findings or movement disorder noted, cranial nerves II through XII grossly intact  Extremities - peripheral pulses palpable, no pedal edema or calf pain with palpation  Skin - no gross lesions, rashes, or induration noted      Consults:  IP CONSULT TO PRIMARY CARE PROVIDER  IP CONSULT TO SOCIAL WORK  IP CONSULT TO VASCULAR SURGERY  IP CONSULT TO VASCULAR SURGERY  IP CONSULT TO PULMONOLOGY  IP CONSULT TO CARDIOLOGY    Disposition: Home    Discharged Condition: Stable    Follow Up: No follow-up provider specified. Lab Frequency Next Occurrence   CBC EVERY OTHER DAY    Up as tolerated PRN    Intake and output EVERY 8 HOURS    Nasal Cannula oxygen DAILY (RT)    Pulse oximetry, continuous CONTINUOUS WITH Q4H RT CHECKS    Basic Metabolic Panel w/ Reflex to MG DAILY    CBC auto differential DAILY    CBC EVERY OTHER DAY          Diet: ADULT DIET; Regular    Discharge Medications:      Medication List      START taking these medications    * apixaban 5 MG Tabs tablet  Commonly known as: ELIQUIS  Take 2 tablets by mouth 2 times daily for 11 doses     * apixaban 5 MG Tabs tablet  Commonly known as: ELIQUIS  Take 1 tablet by mouth 2 times daily for 360 doses From 6/25/2022  Start taking on: June 23, 2022         * This list has 2 medication(s) that are the same as other medications prescribed for you. Read the directions carefully, and ask your doctor or other care provider to review them with you.             CONTINUE taking these medications    b complex vitamins capsule     CO Q10 PO     therapeutic multivitamin-minerals tablet     VITAMIN C PO        STOP taking these medications    TURMERIC PO           Where to Get Your Medications      These medications were sent to [facility]  [address] [phone] Kiah Li [phone]  [address] [place] [address]    Phone: [phone]   · apixaban 5 MG Tabs tablet  · apixaban 5 MG Tabs tablet         Code Status:  Full Code    Time Spent on discharge is  35 mins in patient examination, evaluation, counseling as well as medication reconciliation, prescriptions for required medications, discharge plan and follow up. Electronically signed by Johnathan Reynolds MD on 6/18/2022 at 11:02 AM     Thank you Dr. Mary Vaughn MD for the opportunity to be involved in this patient's care. This note was created with the assistance of a speech-recognition program.  Although the intention is to generate a document that actually reflects the content of the visit, no guarantees can be provided that every mistake has been identified and corrected by editing. Note was updated later by me after  physical examination and  completion of the assessment.

## 2022-06-18 NOTE — PROGRESS NOTES
Pulmonary Critical Care Progress Note    Patient seen for the follow up of PE (pulmonary thromboembolism) ([address]     Subjective:    He has improved shortness of breath. He denies chest pain. Is on room air. He has some left lower extremity discomfort. No history of DVT or PE. He used to work as a chiropractor. Examination:    Vitals: /74   Pulse 58   Temp 97 °F (36.1 °C) (Oral)   Resp 19   Ht 6' 5\" (1.956 m)   Wt 287 lb 6 oz (130.4 kg)   SpO2 95%   BMI 34.08 kg/m²   SpO2  Av.4 %  Min: 82 %  Max: 98 %  General appearance: alert and cooperative with exam  Neck: No JVD  Lungs:  normal  breath sounds no crackles   Heart: regular rate and rhythm, S1, S2 normal, no gallop  Abdomen: Soft, non tender, + BS  Extremities: no cyanosis or clubbing. No significant edema    LABs:    CBC:   Recent Labs     22  0217 22  0521   WBC 6.4 6.9   HGB 12.1* 12.3*   HCT 37.0* 37.9*    166     BMP:   Recent Labs     22  0217 22  0521    140   K 4.3 4.4   CO2 24 24   BUN 14 18   CREATININE 0.86 1.01   LABGLOM >60 >60   GLUCOSE 120* 113*     PT/INR:   Recent Labs     06/15/22  2102 22  1917   PROTIME 13.4 13.5   INR 1.0 1.0     APTT:  Recent Labs     22  0217 22  1026   APTT 145.6* 37.5*       Radiology:    CTA chest 6/15  1. Significantly motion limited exam. Trice Susanna is acute pulmonary embolus in   the right main pulmonary artery extending into the segmental right lower lobe   and possibly a right middle lobe pulmonary arteries.  No definite left-sided   pulmonary embolus. 2. Findings suggestive of right heart strain. 3. Nonspecific bibasilar curvilinear and ground-glass opacities which may be   related to at atelectasis but can also be seen in the setting of recent viral   infection. Echocardiogram   left ventricle is normal in size. Mild left ventricular hypertrophy.   Global left ventricular systolic function is normal with an estimated ejection fraction of 60 % . No obvious wall motion abnormality seen. No significant valvular regurgitation or stenosis seen. No pericardial effusion is seen.     Lower extremity venous Doppler       Isolated superficial vein thrombosis of a varicose vein at the left ankle.    No evidence of deep venous thrombosis in the left lower extremity           Impression:  · Acute hypoxic respiratory insufficiency   · Acute pulmonary embolism, right main pulmonary artery extending into the segmental right lower and middle lobe pulmonary arteries without cor pulmonale based on echo  · DVT, left lower extremity   · History of prostate cancer status post prostatectomy  · Suspected obstructive sleep apnea/obesity      Recommendations:    · Incentive spirometry every hour while awake  · Cardiology on consult   · Switch heparin drip to Eliquis to finish total of 6 months treatment  · Recommend outpatient sleep evaluation  · Discussed with RN  · Okay to discharge from pulmonary point      Angela Levy MD, MD, [facility]  Pulmonary Critical Care and Sleep Medicine,  [facility]  Cell: [phone]  Office: [phone]

## 2022-06-18 NOTE — PROGRESS NOTES
Section of Cardiology  Progress Note      Date:  6/18/2022  Patient: Hilaria Sylvester  Admission:  6/15/2022  8:00 PM  Admit DX: DVT (deep vein thrombosis) in pregnancy [O22.30]  Acute deep vein thrombosis (DVT) of femoral vein of left lower extremity (HCC) [I82.412]  PE (pulmonary thromboembolism) ([facility] [I26.99]  Age:  81 y.o., 1950     LOS: 3 days     Reason for evaluation:   AV harpreet block      SUBJECTIVE:     The patient was seen and examined. Notes and labs reviewed. There were not complications over night. Patient seen and examined earlier today. He is sitting in his bed and appears to be comfortable. Patient's cardiac review of systems: negative for chest pain, dyspnea, irregular heart beat, near-syncope and palpitations. The patient is generally feeling gradually improving. OBJECTIVE:    Telemetry: Sinus and occasional PACs  /74   Pulse 58   Temp 97 °F (36.1 °C) (Oral)   Resp 19   Ht 6' 5\" (1.956 m)   Wt 287 lb 6 oz (130.4 kg)   SpO2 95%   BMI 34.08 kg/m²     Intake/Output Summary (Last 24 hours) at 6/18/2022 1305  Last data filed at 6/18/2022 0915  Gross per 24 hour   Intake 520 ml   Output 650 ml   Net -130 ml       EXAM:   CONSTITUTIONAL:  awake, alert, cooperative, no apparent distress, and appears stated age. HEENT: Normal jugular venous pulsations, no carotid bruits. Head is atraumatic, normocephalic. Eyes and oral mucosa are normal.  LUNGS: Good respiratory effort. No for increased work of breathing. On auscultation: clear to auscultation bilaterally  CARDIOVASCULAR:  Normal apical impulse, regular rate and rhythm, normal S1 and S2, no S3 or S4,   ABDOMEN: Soft, nontender, nondistended. Bowel sounds present. SKIN: Warm and dry. EXTREMITIES: No lower extremities edema. Motor movement grossly intact. No cyanosis or clubbing.     Current Inpatient Medications:   apixaban  10 mg Oral BID    Followed by   Madyson Arroyo ON 6/23/2022] apixaban  5 mg Oral BID    sodium chloride flush  10 mL IntraVENous 2 times per day       IV Infusions (if any):   sodium chloride         Diagnostics:   EKG: . ECHO: reviewed. Ejection fraction: %  Stress Test: not obtained. Cardiac Angiography: not obtained. Labs:   CBC:   Recent Labs     06/17/22 0217 06/18/22  0521   WBC 6.4 6.9   HGB 12.1* 12.3*   HCT 37.0* 37.9*    166     BMP:   Recent Labs     06/17/22 0217 06/18/22  0521    140   K 4.3 4.4   CO2 24 24   BUN 14 18   CREATININE 0.86 1.01   LABGLOM >60 >60   GLUCOSE 120* 113*     No results found for: BNP  PT/INR:   Recent Labs     06/15/22  2102 06/16/22  1917   PROTIME 13.4 13.5   INR 1.0 1.0     APTT:  Recent Labs     06/17/22 0217 06/17/22  1026   APTT 145.6* 37.5*     CARDIAC ENZYMES:No results for input(s): CKTOTAL, CKMB, CKMBINDEX, TROPONINT in the last 72 hours. FASTING LIPID PANEL:No results found for: HDL, LDLDIRECT, LDLCALC, TRIG  LIVER PROFILE:No results for input(s): AST, ALT, LABALBU in the last 72 hours. ASSESSMENT:  · Second-degree AV block, type I, Wenckebach intermittent and asymptomatic with stable blood pressure  · DVT/PE on heparin drip and managed by others  · Preserved LV systolic function on echocardiogram done 6/16/2022 with also normal right ventricular size and function  · History of melanoma and prostate cancer, managed by others  · Possible sleep apnea, managed by others    Patient Active Problem List   Diagnosis    Prostate cancer ([facility]    DVT (deep venous thrombosis) ([facility]    PE (pulmonary thromboembolism) ([facility]       PLAN:    1. No objection to discharge the patient and follow-up with his cardiologist.  2. For the last 24 hours there is no evidence of second-degree AV block      Please see orders. Discussed with patient and nursing.     Blanca Herron MD, MD

## 2022-06-18 NOTE — PROGRESS NOTES
Perfect serve message sent to Dr. Derek Villalobos regarding discharge. Patient is very eager to go. Okay to proceed with discharge. Spoke to wife Spencer Cisneros and she will be on her way soon.

## 2022-06-18 NOTE — DISCHARGE INSTR - DIET
Good nutrition is important when healing from an illness, injury, or surgery. Follow any nutrition recommendations given to you during your hospital stay. If you were given an oral nutrition supplement while in the hospital, continue to take this supplement at home. You can take it with meals, in-between meals, and/or before bedtime. These supplements can be purchased at most local grocery stores, pharmacies, and chain [employer]-stores. If you have any questions about your diet or nutrition, call the hospital and ask for the dietitian. Regular diet.

## 2022-06-18 NOTE — PROGRESS NOTES
Discharge teaching and instructions  completed with patient and wife Cynthia Alvarenga using teachback method. AVS reviewed. Patient voiced understanding regarding prescriptions, follow up appointments, and care of self at home. Discharged home with eliquis coupon as well. All questions answered.

## 2022-06-18 NOTE — CARE COORDINATION
Call from nurse and script for Eliquis E scribed to [facility] in [place]. Cash price is $524.00. Free 30 day trial offer card given to pt for Eliquis and informed pt to call Eliquis number on Monday for pt assistance for future scripts. Spoke to [employer] rep and she will drop samples off to Dr. Denis Patterson office on Monday for pt. Pt will F/U with Dr. Radha Landers.

## 2022-06-18 NOTE — PLAN OF CARE
Problem: Discharge Planning  Goal: Discharge to home or other facility with appropriate resources  Recent Flowsheet Documentation  Taken 6/17/2022 2000 by Evelyn Redding RN  Discharge to home or other facility with appropriate resources: Identify barriers to discharge with patient and caregiver  Problem: Pain  Goal: Verbalizes/displays adequate comfort level or baseline comfort level  6/17/2022 2258 by Evelyn Redding RN  Outcome: Progressing     Problem: Pain  Goal: Verbalizes/displays adequate comfort level or baseline comfort level  6/17/2022 2258 by Evelyn Redding RN  Outcome: Progressing     Problem: Safety - Adult  Goal: Free from fall injury  6/17/2022 2258 by Evelyn Redding RN  Outcome: Progressing     Problem: Skin/Tissue Integrity  Goal: Absence of new skin breakdown  Description: 1. Monitor for areas of redness and/or skin breakdown  2. Assess vascular access sites hourly  3. Every 4-6 hours minimum:  Change oxygen saturation probe site  4. Every 4-6 hours:  If on nasal continuous positive airway pressure, respiratory therapy assess nares and determine need for appliance change or resting period. 6/17/2022 2258 by Evelyn Redding RN  Outcome: Progressing     Problem: Skin/Tissue Integrity  Goal: Absence of new skin breakdown  Description: 1. Monitor for areas of redness and/or skin breakdown  2. Assess vascular access sites hourly  3. Every 4-6 hours minimum:  Change oxygen saturation probe site  4. Every 4-6 hours:  If on nasal continuous positive airway pressure, respiratory therapy assess nares and determine need for appliance change or resting period.   6/17/2022 2258 by Evelyn Redding RN  Outcome: Progressing     Outcome: Progressing  Discharge to home or other facility with appropriate resources:   Identify barriers to discharge with patient and caregiver   Identify discharge learning needs (meds, wound care, etc)   Arrange for needed discharge resources and transportation as appropriate

## 2022-06-18 NOTE — FLOWSHEET NOTE
[facility]  PROGRESS NOTE    Room # 1110/1110-01   Name: Ny Gonzalez            Faith: Tonio Hongster     Reason for visit: Routine    I visited the patient. Admit Date & Time: 6/15/2022  8:00 PM    Assessment:  Ny Gonzalez is a 81 y.o. male in the hospital because pulmonary thromboelism. Upon entering the room pt was sitting up in chair      Intervention:  I introduced myself and my title as  I offered space for pt  to express feelings, needs, and concerns and provided a ministry presence. Engaged in conversation with pt. Actively listened to pt and provided ministry of presence    Outcome:  Pt expressed gratitude for visit    Plan:  Chaplains will remain available to offer spiritual and emotional support as needed. Electronically signed by Ambrose Vela on 6/18/2022 at 1:57 PM.  Jayda         06/18/22 1355   Encounter Summary   Encounter Overview/Reason  Initial Encounter   Service Provided For: Patient   Referral/Consult From: [facility]   Support System Spouse; Children   Last Encounter  06/18/22   Complexity of Encounter Low   Begin Time 1340   End Time  1350   Total Time Calculated 10 min   Encounter    Type Initial Screen/Assessment   Assessment/Intervention/Outcome   Assessment Calm;Coping   Intervention Active listening;Sustaining Presence/Ministry of presence   Outcome Engaged in conversation; Optimistic;Expressed Gratitude

## 2022-06-18 NOTE — PLAN OF CARE
Problem: Pain  Goal: Verbalizes/displays adequate comfort level or baseline comfort level  6/18/2022 0940 by Jose Ariza RN  Outcome: Progressing  Patient c/o left leg pain. Medicated as ordered per pt request. Patient states pain level was a 6. Education on non pharmacological comfort measures as well. Repositioned for comfort.

## 2022-07-12 NOTE — PROGRESS NOTES
Physician Progress Note      Dontrell Harris  [facility] #:                  [id number]  :                       1950  ADMIT DATE:       6/15/2022 8:00 PM  [address] [place] DATE:        2022 3:51 PM  RESPONDING  PROVIDER #:        Ann-Marie Subramanian MD          QUERY TEXT:    Patient admitted with DVT, PE. Noted documentation of acute respiratory   failure in H&P  . In order to support the diagnosis of acute respiratory   failure, please include additional clinical indicators in your documentation. Or please document if the diagnosis of acute respiratory failure has been   ruled out after further study. The medical record reflects the following:  Risk Factors: PE  Clinical Indicators: RR 13-30 on RA , had desaturation to 82 x1 placed on 3L   nc . with 98% repeat saturation. Per ED notes: Respiratory: Negative for   shortness of breath. His respiration is 12 and oxygen saturation is 94%. Effort: Pulmonary effort is normal. No respiratory distress. Breath sounds:   Normal breath sounds. No wheezing or rales. H&P: Chest - clear to   auscultation, normal effort  Treatment: Supplemental oxygen, pulse oximetry, pulmonary consult      Acute Respiratory Failure Clinical Indicators per  MS-DRG Training Guide and   Quick Reference Guide:  pO2 < 60 mmHg or SpO2 (pulse oximetry) < 91% breathing room air  pCO2 > 50 and pH < 7.35  P/F ratio (pO2 / FIO2) < 300  pO2 decrease or pCO2 increase by 10 mmHg from baseline (if known)  Supplemental oxygen of 40% or more  Presence of respiratory distress, tachypnea, dyspnea, shortness of breath,   wheezing  Unable to speak in complete sentences  Use of accessory muscles to breathe  Extreme anxiety and feeling of impending doom  Tripod position  Confusion/altered mental status/obtunded  Options provided:  -- Acute Respiratory Failure as evidenced by, Please document evidence.   -- Acute Respiratory Failure ruled out after study  -- Other - I will add my own diagnosis  -- Disagree - Not applicable / Not valid  -- Disagree - Clinically unable to determine / Unknown  -- Refer to Clinical Documentation Reviewer    PROVIDER RESPONSE TEXT:    This patient is in acute respiratory failure as evidenced by Acute resp   insufficiency    Query created by: Miladys Jensen on 6/17/2022 10:58 AM      Electronically signed by:  Elvis Zuleta MD 7/12/2022 3:51 PM

## (undated) DEVICE — Device

## (undated) DEVICE — NDL CNTR 40CT FM MAG: Brand: MEDLINE INDUSTRIES, INC.

## (undated) DEVICE — 3M™ IOBAN™ 2 ANTIMICROBIAL INCISE DRAPE 6650EZ: Brand: IOBAN™ 2

## (undated) DEVICE — STRAP,CATHETER,ELASTIC,HOOK&LOOP: Brand: MEDLINE

## (undated) DEVICE — SUTURE PDS II SZ 0 L36IN ABSRB VLT L36MM CT-1 1/2 CIR Z346H

## (undated) DEVICE — SUTURE V-LOC 180 SZ 0 L9IN ABSRB GRN GS-21 L37MM 1/2 CIR VLOCL0346

## (undated) DEVICE — SYRINGE 20ML LL S/C 50

## (undated) DEVICE — APPLICATOR LAP 35 CM 2 RIGID VISTASEAL

## (undated) DEVICE — BLADELESS OBTURATOR: Brand: WECK VISTA

## (undated) DEVICE — SOLUTION ANTIFOG VIS SYS CLEARIFY LAPSCP

## (undated) DEVICE — METER,URINE,400ML,DRAIN BAG,L/F,LL: Brand: MEDLINE

## (undated) DEVICE — INTENDED FOR TISSUE SEPARATION, AND OTHER PROCEDURES THAT REQUIRE A SHARP SURGICAL BLADE TO PUNCTURE OR CUT.: Brand: BARD-PARKER ® CARBON RIB-BACK BLADES

## (undated) DEVICE — 40580 - THE PINK PAD - ADVANCED TRENDELENBURG POSITIONING KIT: Brand: 40580 - THE PINK PAD - ADVANCED TRENDELENBURG POSITIONING KIT

## (undated) DEVICE — TIP COVER ACCESSORY

## (undated) DEVICE — AGENT HEMSTAT 3GM OXIDIZED REGENERATED CELOS ABSRB FOR CONT (ORDER MULTIPLES OF 5EA)

## (undated) DEVICE — CLIP INT XL YEL POLYMER HEM-O-LOK WECK

## (undated) DEVICE — GARMENT,MEDLINE,DVT,INT,CALF,MED, GEN2: Brand: MEDLINE

## (undated) DEVICE — Z DISCONTINUED USE 2717540 SUTURE ABSORBABLE MONOFILAMENT 3-0 RB 1 6 IN STRATAFIX SPRL

## (undated) DEVICE — STERILE SURGICAL LUBRICANT, METAL TUBE: Brand: SURGILUBE

## (undated) DEVICE — TROCAR: Brand: KII FIOS FIRST ENTRY

## (undated) DEVICE — COVER LT HNDL BLU PLAS

## (undated) DEVICE — APPLICATOR MEDICATED 26 CC SOLUTION HI LT ORNG CHLORAPREP

## (undated) DEVICE — CANNULA SEAL

## (undated) DEVICE — GLOVE ORTHO 7 1/2   MSG9475

## (undated) DEVICE — DRAPE,REIN 53X77,STERILE: Brand: MEDLINE

## (undated) DEVICE — BLANKET WRM W29.9XL79.1IN UP BODY FORC AIR MISTRAL-AIR

## (undated) DEVICE — SUTURE MCRYL SZ 4-0 L18IN ABSRB UD L16MM PC-3 3/8 CIR PRIM Y845G

## (undated) DEVICE — ARM DRAPE

## (undated) DEVICE — HYPODERMIC SAFETY NEEDLE: Brand: MAGELLAN

## (undated) DEVICE — INSUFFLATION NEEDLE TO ESTABLISH PNEUMOPERITONEUM.: Brand: INSUFFLATION NEEDLE

## (undated) DEVICE — CATHETER URETH 18FR BLLN 30CC 2 W F INF CTRL BARDX

## (undated) DEVICE — TRI-LUMEN FILTERED TUBE SET WITH ACTIVATED CHARCOAL FILTER: Brand: AIRSEAL

## (undated) DEVICE — ADHESIVE SKIN CLSR 0.7ML TOP DERMBND ADV

## (undated) DEVICE — AIRSEAL 12 MM ACCESS PORT AND PALM GRIP OBTURATOR WITH BLADELESS OPTICAL TIP, 120 MM LENGTH: Brand: AIRSEAL

## (undated) DEVICE — SUTURE MCRYL SZ 3-0 L27IN ABSRB VLT L17MM RB-1 1/2 CIR Y305H

## (undated) DEVICE — CONTAINER,SPECIMEN,OR STERILE,4OZ: Brand: MEDLINE